# Patient Record
Sex: FEMALE | Race: WHITE | NOT HISPANIC OR LATINO | Employment: OTHER | URBAN - METROPOLITAN AREA
[De-identification: names, ages, dates, MRNs, and addresses within clinical notes are randomized per-mention and may not be internally consistent; named-entity substitution may affect disease eponyms.]

---

## 2021-01-01 ENCOUNTER — NURSING HOME VISIT (OUTPATIENT)
Dept: GERIATRICS | Facility: OTHER | Age: 86
End: 2021-01-01
Payer: COMMERCIAL

## 2021-01-01 ENCOUNTER — NURSING HOME VISIT (OUTPATIENT)
Dept: GERIATRICS | Facility: CLINIC | Age: 86
End: 2021-01-01
Payer: COMMERCIAL

## 2021-01-01 VITALS
TEMPERATURE: 98.2 F | DIASTOLIC BLOOD PRESSURE: 65 MMHG | SYSTOLIC BLOOD PRESSURE: 150 MMHG | HEART RATE: 52 BPM | WEIGHT: 165.1 LBS

## 2021-01-01 VITALS
DIASTOLIC BLOOD PRESSURE: 74 MMHG | SYSTOLIC BLOOD PRESSURE: 177 MMHG | WEIGHT: 163 LBS | HEART RATE: 72 BPM | TEMPERATURE: 98.3 F

## 2021-01-01 DIAGNOSIS — R41.89 COGNITIVE IMPAIRMENT: Primary | ICD-10-CM

## 2021-01-01 DIAGNOSIS — Z66 DNR (DO NOT RESUSCITATE): ICD-10-CM

## 2021-01-01 DIAGNOSIS — K21.9 GASTROESOPHAGEAL REFLUX DISEASE, UNSPECIFIED WHETHER ESOPHAGITIS PRESENT: ICD-10-CM

## 2021-01-01 DIAGNOSIS — E11.22 TYPE 2 DIABETES MELLITUS WITH STAGE 3 CHRONIC KIDNEY DISEASE, WITHOUT LONG-TERM CURRENT USE OF INSULIN, UNSPECIFIED WHETHER STAGE 3A OR 3B CKD (HCC): Primary | ICD-10-CM

## 2021-01-01 DIAGNOSIS — R26.2 AMBULATORY DYSFUNCTION: ICD-10-CM

## 2021-01-01 DIAGNOSIS — E87.6 HYPOKALEMIA: ICD-10-CM

## 2021-01-01 DIAGNOSIS — R41.89 COGNITIVE IMPAIRMENT: ICD-10-CM

## 2021-01-01 DIAGNOSIS — N18.30 TYPE 2 DIABETES MELLITUS WITH STAGE 3 CHRONIC KIDNEY DISEASE, WITHOUT LONG-TERM CURRENT USE OF INSULIN, UNSPECIFIED WHETHER STAGE 3A OR 3B CKD (HCC): Primary | ICD-10-CM

## 2021-01-01 DIAGNOSIS — E78.5 HYPERLIPIDEMIA, UNSPECIFIED HYPERLIPIDEMIA TYPE: ICD-10-CM

## 2021-01-01 DIAGNOSIS — N18.30 TYPE 2 DIABETES MELLITUS WITH STAGE 3 CHRONIC KIDNEY DISEASE, WITHOUT LONG-TERM CURRENT USE OF INSULIN, UNSPECIFIED WHETHER STAGE 3A OR 3B CKD (HCC): ICD-10-CM

## 2021-01-01 DIAGNOSIS — D50.9 IRON DEFICIENCY ANEMIA, UNSPECIFIED IRON DEFICIENCY ANEMIA TYPE: ICD-10-CM

## 2021-01-01 DIAGNOSIS — E11.22 TYPE 2 DIABETES MELLITUS WITH STAGE 3 CHRONIC KIDNEY DISEASE, WITHOUT LONG-TERM CURRENT USE OF INSULIN, UNSPECIFIED WHETHER STAGE 3A OR 3B CKD (HCC): ICD-10-CM

## 2021-01-01 DIAGNOSIS — I10 BENIGN ESSENTIAL HYPERTENSION: ICD-10-CM

## 2021-01-01 DIAGNOSIS — E87.6 HYPOKALEMIA: Primary | ICD-10-CM

## 2021-01-01 DIAGNOSIS — I69.30 CHRONIC CEREBROVASCULAR ACCIDENT (CVA): ICD-10-CM

## 2021-01-01 DIAGNOSIS — I10 PRIMARY HYPERTENSION: ICD-10-CM

## 2021-01-01 DIAGNOSIS — Z86.73 HISTORY OF STROKE: ICD-10-CM

## 2021-01-01 DIAGNOSIS — R54 FRAILTY SYNDROME IN GERIATRIC PATIENT: Primary | ICD-10-CM

## 2021-01-01 DIAGNOSIS — F03.90 DEMENTIA WITHOUT BEHAVIORAL DISTURBANCE, UNSPECIFIED DEMENTIA TYPE (HCC): ICD-10-CM

## 2021-01-01 DIAGNOSIS — R53.81 DEBILITY: ICD-10-CM

## 2021-01-01 DIAGNOSIS — N30.00 ACUTE CYSTITIS WITHOUT HEMATURIA: ICD-10-CM

## 2021-01-01 DIAGNOSIS — R35.0 URINARY FREQUENCY: ICD-10-CM

## 2021-01-01 DIAGNOSIS — N18.30 STAGE 3 CHRONIC KIDNEY DISEASE, UNSPECIFIED WHETHER STAGE 3A OR 3B CKD (HCC): ICD-10-CM

## 2021-01-01 DIAGNOSIS — I10 HYPERTENSION, UNSPECIFIED TYPE: ICD-10-CM

## 2021-01-01 DIAGNOSIS — K21.9 GASTROESOPHAGEAL REFLUX DISEASE, UNSPECIFIED WHETHER ESOPHAGITIS PRESENT: Primary | ICD-10-CM

## 2021-01-01 DIAGNOSIS — I10 BENIGN ESSENTIAL HYPERTENSION: Primary | ICD-10-CM

## 2021-01-01 PROCEDURE — 99309 SBSQ NF CARE MODERATE MDM 30: CPT | Performed by: PHYSICIAN ASSISTANT

## 2021-01-01 PROCEDURE — 99309 SBSQ NF CARE MODERATE MDM 30: CPT | Performed by: FAMILY MEDICINE

## 2021-01-01 PROCEDURE — 99316 NF DSCHRG MGMT 30 MIN+: CPT | Performed by: NURSE PRACTITIONER

## 2021-01-01 PROCEDURE — 99309 SBSQ NF CARE MODERATE MDM 30: CPT | Performed by: INTERNAL MEDICINE

## 2021-01-01 PROCEDURE — 99306 1ST NF CARE HIGH MDM 50: CPT | Performed by: FAMILY MEDICINE

## 2021-01-01 PROCEDURE — 99309 SBSQ NF CARE MODERATE MDM 30: CPT | Performed by: NURSE PRACTITIONER

## 2021-01-01 RX ORDER — PANTOPRAZOLE SODIUM 40 MG/1
40 TABLET, DELAYED RELEASE ORAL DAILY
COMMUNITY

## 2021-01-01 RX ORDER — POLYETHYLENE GLYCOL 3350 17 G/17G
17 POWDER, FOR SOLUTION ORAL DAILY PRN
COMMUNITY
End: 2021-01-01 | Stop reason: ALTCHOICE

## 2021-01-01 RX ORDER — ALBUTEROL SULFATE 2.5 MG/3ML
2.5 SOLUTION RESPIRATORY (INHALATION) EVERY 4 HOURS PRN
COMMUNITY
End: 2021-01-01 | Stop reason: ALTCHOICE

## 2021-01-01 RX ORDER — ATORVASTATIN CALCIUM 40 MG/1
40 TABLET, FILM COATED ORAL DAILY
COMMUNITY

## 2021-01-01 RX ORDER — AMLODIPINE BESYLATE 5 MG/1
5 TABLET ORAL 2 TIMES DAILY
COMMUNITY
End: 2022-01-01 | Stop reason: ALTCHOICE

## 2021-01-01 RX ORDER — LOSARTAN POTASSIUM 100 MG/1
100 TABLET ORAL DAILY
COMMUNITY
End: 2022-01-01 | Stop reason: ALTCHOICE

## 2021-01-01 RX ORDER — POTASSIUM CHLORIDE 20 MEQ/1
40 TABLET, EXTENDED RELEASE ORAL DAILY
COMMUNITY

## 2021-01-01 RX ORDER — AMOXICILLIN 250 MG
1 CAPSULE ORAL
COMMUNITY
Start: 2022-01-01

## 2021-01-01 RX ORDER — BISACODYL 10 MG
10 SUPPOSITORY, RECTAL RECTAL DAILY PRN
COMMUNITY

## 2021-01-01 RX ORDER — ACETAMINOPHEN 650 MG/1
650 SUPPOSITORY RECTAL EVERY 4 HOURS PRN
COMMUNITY
Start: 2022-01-01

## 2021-01-01 RX ORDER — ACETAMINOPHEN 325 MG/1
650 TABLET ORAL EVERY 4 HOURS PRN
COMMUNITY
Start: 2022-01-01

## 2021-01-01 RX ORDER — FERROUS SULFATE 325(65) MG
325 TABLET ORAL
COMMUNITY
Start: 2022-01-01 | End: 2022-01-01 | Stop reason: ALTCHOICE

## 2021-01-01 RX ORDER — B-COMPLEX WITH VITAMIN C
1 TABLET ORAL 2 TIMES DAILY
COMMUNITY

## 2021-01-01 RX ORDER — ACETAMINOPHEN 325 MG/1
650 TABLET ORAL EVERY 6 HOURS PRN
COMMUNITY
End: 2021-01-01 | Stop reason: DRUGHIGH

## 2021-01-01 RX ORDER — LANOLIN ALCOHOL/MO/W.PET/CERES
3 CREAM (GRAM) TOPICAL
COMMUNITY
End: 2021-01-01 | Stop reason: ALTCHOICE

## 2021-01-01 RX ORDER — SODIUM PHOSPHATE,MONO-DIBASIC 19G-7G/118
1 ENEMA (ML) RECTAL DAILY PRN
COMMUNITY

## 2021-07-22 ENCOUNTER — NURSING HOME VISIT (OUTPATIENT)
Dept: GERIATRICS | Facility: OTHER | Age: 86
End: 2021-07-22
Payer: COMMERCIAL

## 2021-07-22 DIAGNOSIS — E87.6 HYPOKALEMIA: ICD-10-CM

## 2021-07-22 DIAGNOSIS — D50.9 IRON DEFICIENCY ANEMIA, UNSPECIFIED IRON DEFICIENCY ANEMIA TYPE: ICD-10-CM

## 2021-07-22 DIAGNOSIS — N18.30 TYPE 2 DIABETES MELLITUS WITH STAGE 3 CHRONIC KIDNEY DISEASE, WITHOUT LONG-TERM CURRENT USE OF INSULIN, UNSPECIFIED WHETHER STAGE 3A OR 3B CKD (HCC): Primary | ICD-10-CM

## 2021-07-22 DIAGNOSIS — R53.81 DEBILITY: ICD-10-CM

## 2021-07-22 DIAGNOSIS — N18.30 STAGE 3 CHRONIC KIDNEY DISEASE, UNSPECIFIED WHETHER STAGE 3A OR 3B CKD (HCC): ICD-10-CM

## 2021-07-22 DIAGNOSIS — I69.30 CHRONIC CEREBROVASCULAR ACCIDENT (CVA): ICD-10-CM

## 2021-07-22 DIAGNOSIS — E11.22 TYPE 2 DIABETES MELLITUS WITH STAGE 3 CHRONIC KIDNEY DISEASE, WITHOUT LONG-TERM CURRENT USE OF INSULIN, UNSPECIFIED WHETHER STAGE 3A OR 3B CKD (HCC): Primary | ICD-10-CM

## 2021-07-22 DIAGNOSIS — I10 BENIGN ESSENTIAL HYPERTENSION: ICD-10-CM

## 2021-07-22 PROCEDURE — 99310 SBSQ NF CARE HIGH MDM 45: CPT | Performed by: FAMILY MEDICINE

## 2021-07-22 NOTE — PROGRESS NOTES
[facility]   [address] [place] [address], [place], [address]  Progress Note  POS: Nursing Facility/LTC-32    Unable to obtain from patient due to: Additional history obtained speaking with nursing/ nursing note review  Chief Complaint/Reason for visit: LTC follow up; transition of physician service  History of Present Illness: 80year old female evaluated for routine follow up of chronic medical conditions along with transition of physician service  I personally spoke with Dr Karli Alcantar via telephone regarding patient history and handoff  Medical records from [facility] obtained through [facility] and reviewed in detail  Active medical issues include hypokalemia, for which patient is on daily potassium supplementation; most recent K of 4 5 in May  She is also on amlodipine and losartan for hypertension, SBP trending 130s-140s  Continues on very low dose metformin for DM2; glucose generally trending 90s-100s on accuchecks; most recent A1c <6  Additionally continues on ferrous sulfate for anemia  Most recent labs from May reviewed; next due approx Sept 2021  Past Medical History: reviewed and updated  Family History: reviewed and updated  Social History: reviewed and updated  Resident Since: 9/24/19  Review of systems: Review of Systems   Constitutional: Negative for fever and unexpected weight change  Respiratory: Negative for cough and shortness of breath  Endocrine:        Negative for hypo- or hyper- glycemia   Musculoskeletal: Negative for arthralgias  All other systems reviewed and are negative  Medications: No changes made   Medication list reviewed and updated in Epic to reflect most current SNF orders  Allergies: NKDA  Labs/Diagnostics (reviewed by this provider): Copy in Chart  HbA1c:(5/3/21)- 5 8  CBC: (5/3/21)  CMP: (5/3/21) K:4 5    Physical Exam    Weight: 161 lb Temp:98 5F BP:143/59  Pulse:70 Resp:18 O2 Sat:91%RA  Constitutional: Well-nourished and Normocephalic    Physical Exam  Vitals and nursing note reviewed  Constitutional:       General: She is sleeping  She is not in acute distress  Appearance: She is well-developed and well-groomed  She is not ill-appearing, toxic-appearing or diaphoretic  HENT:      Head: Normocephalic and atraumatic  Right Ear: External ear normal       Left Ear: External ear normal       Nose: No rhinorrhea  Mouth/Throat:      Mouth: Mucous membranes are moist    Eyes:      General:         Right eye: No discharge  Left eye: No discharge  Pulmonary:      Effort: Pulmonary effort is normal  No respiratory distress  Abdominal:      General: There is no distension  Musculoskeletal:         General: No deformity  Cervical back: No rigidity  Right lower leg: No edema  Left lower leg: No edema  Skin:     Coloration: Skin is not jaundiced or pale  Neurological:      Mental Status: She is easily aroused  Cranial Nerves: No facial asymmetry         Assessment/Plan:  80year old female with:    DM [facility]diabetes mellitus), [facility] 2 ([facility] )    Lab Results   Component Value Date    HGBA1C 5 8 (H) 05/03/2021   Continue metformin 250mg daily  D/c accuchecks, have been consistently trending 90s-100s  Goal A1c <8 5 given age and comorbidities; on very low dose of metformin- will plan to discontinue if A1c remains <8 on repeat labs ordered for Sept 2021    Benign essential hypertension  Goal <140/90 in setting of DM2; SBP generally trending 130s-140s  Continue losartan 100mg daily; amlodipine 5mg BID; monitor BMP closely- at risk for hyperkalemia in setting of CKD3 and on chronic potassium supplementation with history of hypokalemia  Repeat BMP ordered for Sept 2021    CKD (chronic kidney disease) stage 3, GFR 30-59 ml/min ([facility])  Baseline creatinine 0 8-0 9  BMP May 2021 reviewed and stable; see above  Recheck BMP Sept 2021    Hypokalemia  See above; monitor renal function and potassium level closely  Continues on supplementation 40meq daily    Chronic cerebrovascular accident (CVA)  Continue atorvastatin  Risks vs benefits of initiating low dose aspirin- patient not taking currently     Iron deficiency anemia  CBC from May 2021 reviewed and stable/improved  Continue ferrous sulfate daily  Recheck CBC Sept 2021    Debility  Multifactorial  Continue LTC for 24/7 and ADL care  Supportive care, nutritional support  Management of chronic medical conditions as outlined  Fall precautions     Erna Munguia DO  7/22/21

## 2021-08-03 PROBLEM — D50.9 IRON DEFICIENCY ANEMIA: Status: ACTIVE | Noted: 2021-01-01

## 2021-08-03 PROBLEM — S06.5XAA SDH (SUBDURAL HEMATOMA): Status: ACTIVE | Noted: 2017-08-25

## 2021-08-03 PROBLEM — I69.30 CHRONIC CEREBROVASCULAR ACCIDENT (CVA): Status: ACTIVE | Noted: 2021-01-01

## 2021-08-03 PROBLEM — R53.81 DEBILITY: Status: ACTIVE | Noted: 2021-01-01

## 2021-08-03 PROBLEM — G89.29 CHRONIC BACK PAIN: Status: ACTIVE | Noted: 2020-01-14

## 2021-08-03 PROBLEM — N18.30 CKD (CHRONIC KIDNEY DISEASE) STAGE 3, GFR 30-59 ML/MIN (HCC): Status: ACTIVE | Noted: 2021-01-01

## 2021-08-03 PROBLEM — E87.6 HYPOKALEMIA: Status: ACTIVE | Noted: 2019-09-20

## 2021-08-03 PROBLEM — M54.9 CHRONIC BACK PAIN: Status: ACTIVE | Noted: 2020-01-14

## 2021-08-03 PROBLEM — I63.81 MULTIPLE LACUNAR INFARCTS (HCC): Status: ACTIVE | Noted: 2017-10-10

## 2021-08-03 PROBLEM — R41.89 COGNITIVE IMPAIRMENT: Status: ACTIVE | Noted: 2019-09-20

## 2021-08-03 PROBLEM — Z86.73 HISTORY OF TIA (TRANSIENT ISCHEMIC ATTACK): Status: ACTIVE | Noted: 2020-01-14

## 2021-08-03 PROBLEM — S06.5X9A SDH (SUBDURAL HEMATOMA) (HCC): Status: ACTIVE | Noted: 2017-08-25

## 2021-08-03 PROBLEM — K21.9 GERD (GASTROESOPHAGEAL REFLUX DISEASE): Status: ACTIVE | Noted: 2020-01-14

## 2021-08-03 NOTE — ASSESSMENT & PLAN NOTE
Goal <140/90 in setting of DM2; SBP generally trending 130s-140s  Continue losartan 100mg daily; amlodipine 5mg BID; monitor BMP closely- at risk for hyperkalemia in setting of CKD3 and on chronic potassium supplementation with history of hypokalemia  Repeat BMP ordered for Sept 2021

## 2021-08-03 NOTE — ASSESSMENT & PLAN NOTE
Lab Results   Component Value Date    HGBA1C 5 8 (H) 05/03/2021   Continue metformin 250mg daily  D/c accuchecks, have been consistently trending 90s-100s  Goal A1c <8 5 given age and comorbidities; on very low dose of metformin- will plan to discontinue if A1c remains <8 on repeat labs ordered for Sept 2021

## 2021-08-03 NOTE — ASSESSMENT & PLAN NOTE
Baseline creatinine 0 8-0 9  BMP May 2021 reviewed and stable; see above  Recheck [facility] Sept 2021

## 2021-08-03 NOTE — ASSESSMENT & PLAN NOTE
Continue atorvastatin  Risks vs benefits of initiating low dose aspirin- patient not taking currently

## 2021-08-03 NOTE — ASSESSMENT & PLAN NOTE
See above; monitor renal function and potassium level closely  Continues on supplementation 40meq daily

## 2021-08-03 NOTE — ASSESSMENT & PLAN NOTE
Multifactorial  Continue LTC for 24/7 and ADL care  Supportive care, nutritional support  Management of chronic medical conditions as outlined  Fall precautions

## 2021-10-05 PROBLEM — K59.00 CONSTIPATION: Status: ACTIVE | Noted: 2021-01-01

## 2021-11-08 PROBLEM — R26.2 AMBULATORY DYSFUNCTION: Status: ACTIVE | Noted: 2021-01-01

## 2021-11-08 PROBLEM — R35.0 URINARY FREQUENCY: Status: ACTIVE | Noted: 2021-01-01

## 2021-11-08 PROBLEM — N30.00 ACUTE CYSTITIS WITHOUT HEMATURIA: Status: ACTIVE | Noted: 2021-01-01

## 2021-12-12 PROBLEM — Z66 DNR (DO NOT RESUSCITATE): Status: ACTIVE | Noted: 2021-01-01

## 2021-12-12 PROBLEM — F03.90 DEMENTIA (HCC): Status: ACTIVE | Noted: 2019-09-20

## 2021-12-12 PROBLEM — Z86.73 HISTORY OF STROKE: Status: ACTIVE | Noted: 2021-01-01

## 2021-12-12 PROBLEM — R54 FRAILTY SYNDROME IN GERIATRIC PATIENT: Status: ACTIVE | Noted: 2021-01-01

## 2022-01-01 ENCOUNTER — HOSPICE ADMISSION (OUTPATIENT)
Dept: HOME HOSPICE | Facility: HOSPICE | Age: 87
End: 2022-01-01
Payer: MEDICARE

## 2022-01-01 ENCOUNTER — HOME CARE VISIT (OUTPATIENT)
Dept: HOME HOSPICE | Facility: HOSPICE | Age: 87
End: 2022-01-01
Payer: MEDICARE

## 2022-01-01 ENCOUNTER — HOME CARE VISIT (OUTPATIENT)
Dept: HOME HEALTH SERVICES | Facility: HOME HEALTHCARE | Age: 87
End: 2022-01-01
Payer: MEDICARE

## 2022-01-01 ENCOUNTER — NURSING HOME VISIT (OUTPATIENT)
Dept: GERIATRICS | Facility: OTHER | Age: 87
End: 2022-01-01
Payer: COMMERCIAL

## 2022-01-01 ENCOUNTER — APPOINTMENT (EMERGENCY)
Dept: RADIOLOGY | Facility: HOSPITAL | Age: 87
End: 2022-01-01
Payer: COMMERCIAL

## 2022-01-01 ENCOUNTER — HOSPITAL ENCOUNTER (EMERGENCY)
Facility: HOSPITAL | Age: 87
Discharge: HOSPICE | End: 2022-07-15
Attending: EMERGENCY MEDICINE | Admitting: EMERGENCY MEDICINE
Payer: COMMERCIAL

## 2022-01-01 ENCOUNTER — TELEPHONE (OUTPATIENT)
Dept: OTHER | Facility: OTHER | Age: 87
End: 2022-01-01

## 2022-01-01 VITALS
HEART RATE: 80 BPM | SYSTOLIC BLOOD PRESSURE: 118 MMHG | DIASTOLIC BLOOD PRESSURE: 68 MMHG | TEMPERATURE: 97.1 F | RESPIRATION RATE: 28 BRPM

## 2022-01-01 VITALS
SYSTOLIC BLOOD PRESSURE: 100 MMHG | WEIGHT: 165 LBS | RESPIRATION RATE: 20 BRPM | DIASTOLIC BLOOD PRESSURE: 58 MMHG | HEART RATE: 80 BPM

## 2022-01-01 VITALS
SYSTOLIC BLOOD PRESSURE: 85 MMHG | HEART RATE: 73 BPM | TEMPERATURE: 97.9 F | OXYGEN SATURATION: 100 % | DIASTOLIC BLOOD PRESSURE: 49 MMHG | WEIGHT: 165.2 LBS | RESPIRATION RATE: 29 BRPM

## 2022-01-01 VITALS
WEIGHT: 165.2 LBS | HEART RATE: 155 BPM | RESPIRATION RATE: 36 BRPM | SYSTOLIC BLOOD PRESSURE: 96 MMHG | DIASTOLIC BLOOD PRESSURE: 55 MMHG | TEMPERATURE: 97.8 F

## 2022-01-01 VITALS
OXYGEN SATURATION: 94 % | SYSTOLIC BLOOD PRESSURE: 134 MMHG | TEMPERATURE: 97.5 F | WEIGHT: 166.5 LBS | DIASTOLIC BLOOD PRESSURE: 63 MMHG

## 2022-01-01 VITALS
DIASTOLIC BLOOD PRESSURE: 78 MMHG | WEIGHT: 165.2 LBS | HEART RATE: 90 BPM | TEMPERATURE: 98.6 F | SYSTOLIC BLOOD PRESSURE: 180 MMHG

## 2022-01-01 VITALS
HEART RATE: 77 BPM | TEMPERATURE: 97.5 F | SYSTOLIC BLOOD PRESSURE: 144 MMHG | WEIGHT: 163.3 LBS | DIASTOLIC BLOOD PRESSURE: 67 MMHG

## 2022-01-01 DIAGNOSIS — R77.8 ELEVATED TROPONIN: ICD-10-CM

## 2022-01-01 DIAGNOSIS — E11.9 TYPE 2 DIABETES MELLITUS WITHOUT COMPLICATION, WITHOUT LONG-TERM CURRENT USE OF INSULIN (HCC): ICD-10-CM

## 2022-01-01 DIAGNOSIS — I63.81 MULTIPLE LACUNAR INFARCTS (HCC): ICD-10-CM

## 2022-01-01 DIAGNOSIS — U07.1 COVID: Primary | ICD-10-CM

## 2022-01-01 DIAGNOSIS — N18.30 STAGE 3 CHRONIC KIDNEY DISEASE, UNSPECIFIED WHETHER STAGE 3A OR 3B CKD (HCC): ICD-10-CM

## 2022-01-01 DIAGNOSIS — Z86.73 HISTORY OF STROKE: ICD-10-CM

## 2022-01-01 DIAGNOSIS — I10 HYPERTENSION, UNSPECIFIED TYPE: Primary | ICD-10-CM

## 2022-01-01 DIAGNOSIS — F03.90 DEMENTIA WITHOUT BEHAVIORAL DISTURBANCE, UNSPECIFIED DEMENTIA TYPE (HCC): ICD-10-CM

## 2022-01-01 DIAGNOSIS — D50.9 IRON DEFICIENCY ANEMIA, UNSPECIFIED IRON DEFICIENCY ANEMIA TYPE: ICD-10-CM

## 2022-01-01 DIAGNOSIS — K59.00 CONSTIPATION, UNSPECIFIED CONSTIPATION TYPE: ICD-10-CM

## 2022-01-01 DIAGNOSIS — I25.10 CORONARY ARTERY DISEASE INVOLVING NATIVE CORONARY ARTERY OF NATIVE HEART WITHOUT ANGINA PECTORIS: ICD-10-CM

## 2022-01-01 DIAGNOSIS — I10 HYPERTENSION, UNSPECIFIED TYPE: ICD-10-CM

## 2022-01-01 DIAGNOSIS — R53.81 DEBILITY: ICD-10-CM

## 2022-01-01 DIAGNOSIS — R54 FRAILTY SYNDROME IN GERIATRIC PATIENT: ICD-10-CM

## 2022-01-01 DIAGNOSIS — K21.9 GASTROESOPHAGEAL REFLUX DISEASE, UNSPECIFIED WHETHER ESOPHAGITIS PRESENT: Primary | ICD-10-CM

## 2022-01-01 DIAGNOSIS — R54 FRAILTY SYNDROME IN GERIATRIC PATIENT: Primary | ICD-10-CM

## 2022-01-01 DIAGNOSIS — Z66 DNR (DO NOT RESUSCITATE): ICD-10-CM

## 2022-01-01 DIAGNOSIS — I21.9 MI (MYOCARDIAL INFARCTION) (HCC): ICD-10-CM

## 2022-01-01 DIAGNOSIS — I47.20 VENTRICULAR TACHYCARDIA: Primary | ICD-10-CM

## 2022-01-01 DIAGNOSIS — E78.5 HYPERLIPIDEMIA, UNSPECIFIED HYPERLIPIDEMIA TYPE: ICD-10-CM

## 2022-01-01 DIAGNOSIS — E87.6 HYPOKALEMIA: ICD-10-CM

## 2022-01-01 DIAGNOSIS — R07.9 CHEST PAIN, UNSPECIFIED TYPE: Primary | ICD-10-CM

## 2022-01-01 LAB
ANION GAP SERPL CALCULATED.3IONS-SCNC: 5 MMOL/L (ref 4–13)
ATRIAL RATE: 102 BPM
ATRIAL RATE: 136 BPM
ATRIAL RATE: 141 BPM
ATRIAL RATE: 148 BPM
ATRIAL RATE: 150 BPM
ATRIAL RATE: 153 BPM
ATRIAL RATE: 157 BPM
ATRIAL RATE: 75 BPM
BASOPHILS # BLD AUTO: 0.05 THOUSANDS/ΜL (ref 0–0.1)
BASOPHILS NFR BLD AUTO: 0 % (ref 0–1)
BUN SERPL-MCNC: 44 MG/DL (ref 5–25)
CALCIUM SERPL-MCNC: 9.4 MG/DL (ref 8.3–10.1)
CARDIAC TROPONIN I PNL SERPL HS: ABNORMAL NG/L
CHLORIDE SERPL-SCNC: 108 MMOL/L (ref 100–108)
CO2 SERPL-SCNC: 24 MMOL/L (ref 21–32)
CREAT SERPL-MCNC: 1.3 MG/DL (ref 0.6–1.3)
EOSINOPHIL # BLD AUTO: 0.25 THOUSAND/ΜL (ref 0–0.61)
EOSINOPHIL NFR BLD AUTO: 2 % (ref 0–6)
ERYTHROCYTE [DISTWIDTH] IN BLOOD BY AUTOMATED COUNT: 15.2 % (ref 11.6–15.1)
GFR SERPL CREATININE-BSD FRML MDRD: 35 ML/MIN/1.73SQ M
GLUCOSE SERPL-MCNC: 148 MG/DL (ref 65–140)
HCT VFR BLD AUTO: 40.1 % (ref 34.8–46.1)
HGB BLD-MCNC: 13 G/DL (ref 11.5–15.4)
IMM GRANULOCYTES # BLD AUTO: 0.15 THOUSAND/UL (ref 0–0.2)
IMM GRANULOCYTES NFR BLD AUTO: 1 % (ref 0–2)
LYMPHOCYTES # BLD AUTO: 1.09 THOUSANDS/ΜL (ref 0.6–4.47)
LYMPHOCYTES NFR BLD AUTO: 8 % (ref 14–44)
MCH RBC QN AUTO: 31.2 PG (ref 26.8–34.3)
MCHC RBC AUTO-ENTMCNC: 32.4 G/DL (ref 31.4–37.4)
MCV RBC AUTO: 96 FL (ref 82–98)
MONOCYTES # BLD AUTO: 1.05 THOUSAND/ΜL (ref 0.17–1.22)
MONOCYTES NFR BLD AUTO: 8 % (ref 4–12)
NEUTROPHILS # BLD AUTO: 11.04 THOUSANDS/ΜL (ref 1.85–7.62)
NEUTS SEG NFR BLD AUTO: 81 % (ref 43–75)
NRBC BLD AUTO-RTO: 0 /100 WBCS
P AXIS: 100 DEGREES
P AXIS: 73 DEGREES
P AXIS: 75 DEGREES
P AXIS: 75 DEGREES
P AXIS: 79 DEGREES
P AXIS: 96 DEGREES
PLATELET # BLD AUTO: 261 THOUSANDS/UL (ref 149–390)
PMV BLD AUTO: 10.9 FL (ref 8.9–12.7)
POTASSIUM SERPL-SCNC: 5.3 MMOL/L (ref 3.5–5.3)
PR INTERVAL: 112 MS
PR INTERVAL: 210 MS
PR INTERVAL: 224 MS
PR INTERVAL: 224 MS
PR INTERVAL: 232 MS
QRS AXIS: -73 DEGREES
QRS AXIS: -73 DEGREES
QRS AXIS: -74 DEGREES
QRS AXIS: -75 DEGREES
QRS AXIS: -75 DEGREES
QRS AXIS: -77 DEGREES
QRS AXIS: -78 DEGREES
QRS AXIS: -81 DEGREES
QRSD INTERVAL: 118 MS
QRSD INTERVAL: 120 MS
QRSD INTERVAL: 142 MS
QRSD INTERVAL: 146 MS
QRSD INTERVAL: 150 MS
QRSD INTERVAL: 150 MS
QRSD INTERVAL: 156 MS
QRSD INTERVAL: 162 MS
QT INTERVAL: 320 MS
QT INTERVAL: 322 MS
QT INTERVAL: 328 MS
QT INTERVAL: 334 MS
QT INTERVAL: 338 MS
QT INTERVAL: 346 MS
QT INTERVAL: 354 MS
QT INTERVAL: 364 MS
QTC INTERVAL: 406 MS
QTC INTERVAL: 435 MS
QTC INTERVAL: 510 MS
QTC INTERVAL: 518 MS
QTC INTERVAL: 520 MS
QTC INTERVAL: 520 MS
QTC INTERVAL: 522 MS
QTC INTERVAL: 530 MS
RBC # BLD AUTO: 4.17 MILLION/UL (ref 3.81–5.12)
SODIUM SERPL-SCNC: 137 MMOL/L (ref 136–145)
T WAVE AXIS: 87 DEGREES
T WAVE AXIS: 94 DEGREES
T WAVE AXIS: 95 DEGREES
T WAVE AXIS: 96 DEGREES
T WAVE AXIS: 96 DEGREES
T WAVE AXIS: 97 DEGREES
T WAVE AXIS: 98 DEGREES
T WAVE AXIS: 99 DEGREES
VENTRICULAR RATE: 102 BPM
VENTRICULAR RATE: 130 BPM
VENTRICULAR RATE: 137 BPM
VENTRICULAR RATE: 148 BPM
VENTRICULAR RATE: 150 BPM
VENTRICULAR RATE: 153 BPM
VENTRICULAR RATE: 157 BPM
VENTRICULAR RATE: 75 BPM
WBC # BLD AUTO: 13.63 THOUSAND/UL (ref 4.31–10.16)

## 2022-01-01 PROCEDURE — G0155 HHCP-SVS OF CSW,EA 15 MIN: HCPCS

## 2022-01-01 PROCEDURE — T2042 HOSPICE ROUTINE HOME CARE: HCPCS

## 2022-01-01 PROCEDURE — 99291 CRITICAL CARE FIRST HOUR: CPT | Performed by: EMERGENCY MEDICINE

## 2022-01-01 PROCEDURE — 99152 MOD SED SAME PHYS/QHP 5/>YRS: CPT | Performed by: EMERGENCY MEDICINE

## 2022-01-01 PROCEDURE — 84484 ASSAY OF TROPONIN QUANT: CPT

## 2022-01-01 PROCEDURE — 36415 COLL VENOUS BLD VENIPUNCTURE: CPT

## 2022-01-01 PROCEDURE — G0156 HHCP-SVS OF AIDE,EA 15 MIN: HCPCS

## 2022-01-01 PROCEDURE — 99308 SBSQ NF CARE LOW MDM 20: CPT | Performed by: PHYSICIAN ASSISTANT

## 2022-01-01 PROCEDURE — 99309 SBSQ NF CARE MODERATE MDM 30: CPT | Performed by: INTERNAL MEDICINE

## 2022-01-01 PROCEDURE — 99285 EMERGENCY DEPT VISIT HI MDM: CPT

## 2022-01-01 PROCEDURE — 92960 CARDIOVERSION ELECTRIC EXT: CPT | Performed by: EMERGENCY MEDICINE

## 2022-01-01 PROCEDURE — G0299 HHS/HOSPICE OF RN EA 15 MIN: HCPCS

## 2022-01-01 PROCEDURE — 85025 COMPLETE CBC W/AUTO DIFF WBC: CPT

## 2022-01-01 PROCEDURE — 93010 ELECTROCARDIOGRAM REPORT: CPT | Performed by: INTERNAL MEDICINE

## 2022-01-01 PROCEDURE — 93308 TTE F-UP OR LMTD: CPT | Performed by: EMERGENCY MEDICINE

## 2022-01-01 PROCEDURE — 99309 SBSQ NF CARE MODERATE MDM 30: CPT | Performed by: NURSE PRACTITIONER

## 2022-01-01 PROCEDURE — 96365 THER/PROPH/DIAG IV INF INIT: CPT

## 2022-01-01 PROCEDURE — 99309 SBSQ NF CARE MODERATE MDM 30: CPT | Performed by: PHYSICIAN ASSISTANT

## 2022-01-01 PROCEDURE — 96360 HYDRATION IV INFUSION INIT: CPT

## 2022-01-01 PROCEDURE — 80048 BASIC METABOLIC PNL TOTAL CA: CPT

## 2022-01-01 PROCEDURE — 99285 EMERGENCY DEPT VISIT HI MDM: CPT | Performed by: INTERNAL MEDICINE

## 2022-01-01 PROCEDURE — 99307 SBSQ NF CARE SF MDM 10: CPT | Performed by: PHYSICIAN ASSISTANT

## 2022-01-01 PROCEDURE — 92960 CARDIOVERSION ELECTRIC EXT: CPT

## 2022-01-01 RX ORDER — ETOMIDATE 2 MG/ML
20 INJECTION INTRAVENOUS ONCE
Status: COMPLETED | OUTPATIENT
Start: 2022-01-01 | End: 2022-01-01

## 2022-01-01 RX ORDER — AMLODIPINE BESYLATE AND BENAZEPRIL HYDROCHLORIDE 5; 20 MG/1; MG/1
1 CAPSULE ORAL DAILY
COMMUNITY
Start: 2022-01-01 | End: 2022-01-01

## 2022-01-01 RX ORDER — DOXYCYCLINE HYCLATE 50 MG/1
324 CAPSULE, GELATIN COATED ORAL EVERY OTHER DAY
COMMUNITY
Start: 2022-01-01

## 2022-01-01 RX ORDER — SODIUM CHLORIDE 9 MG/ML
3 INJECTION INTRAVENOUS
Status: DISCONTINUED | OUTPATIENT
Start: 2022-01-01 | End: 2022-01-01 | Stop reason: HOSPADM

## 2022-01-01 RX ADMIN — AMIODARONE HYDROCHLORIDE 150 MG: 50 INJECTION, SOLUTION INTRAVENOUS at 11:34

## 2022-01-01 RX ADMIN — SODIUM CHLORIDE 1000 ML: 0.9 INJECTION, SOLUTION INTRAVENOUS at 11:30

## 2022-01-01 RX ADMIN — ETOMIDATE 20 MG: 2 INJECTION, SOLUTION INTRAVENOUS at 11:04

## 2022-01-03 NOTE — PROGRESS NOTES
[address]  [address]  Facility: [facility]  32  Follow-up visit    NAME: Maria Isabel Teresa  AGE: 80 y o  SEX: female    DATE OF ENCOUNTER: 1/3/2022    Code status:  DNR    Assessment and Plan     1  Frailty syndrome in geriatric patient  Assessment & Plan:  · Secondary to her chronic medical conditions   · She continues to require 24/7 care/support of her ADLs  · She is level 6 on the clinical frailty scale consistent with being moderately frail   · Will continue with care/support of her ADLs as a long-term resident at the nursing facility      2  Dementia without behavioral disturbance, unspecified dementia type [facility])  Assessment & Plan:  · She is doing well with care/support of her ADLs as a long-term resident at the nursing facility   · Will continue to provide a safe, secure, structured, and supportive environment at the nursing facility   · Will continue to monitor for change in her condition      3  Multiple lacunar infarcts [facility])  Assessment & Plan:  · She continues on atorvastatin for secondary prevention   · Per discussion with her daughter, she is not on aspirin secondary to prior history of GI bleeding  · Will continue with clinical and periodic laboratory monitoring for change in her condition      4  Hypertension, unspecified type  Assessment & Plan:  · She continues on amlodipine and losartan   · As discussed with nursing, will continue with monitoring of her BP and AP on a routine basis   · Will continue with clinical and periodic laboratory monitoring for change in her condition      5  Hyperlipidemia, unspecified hyperlipidemia type  Assessment & Plan:  · She continues on atorvastatin  · Will continue with clinical and periodic laboratory monitoring for change in her condition      6   Type 2 diabetes mellitus without complication, without long-term current use of insulin [facility])  Assessment & Plan:  · October 6, 2021: Hemoglobin A1c:  6 1%,    · She is doing well without medication  · Will continue with clinical and periodic laboratory monitoring for change in her condition      7  Constipation, unspecified constipation type  Assessment & Plan:  · Nursing staff endorses that she is doing well on routine Senokot S   · Will continue with this medication   · Will continue with monitoring for change in her condition      8  DNR (do not resuscitate)    See my note of December 10, 2021 for further assessment and plan  All medications and routine orders were reviewed and updated as needed  Plan discussed with:  Nursing staff  Chief Complaint     She is seen for a follow-up visit to update the care and treatment of her type 2 diabetes mellitus, frailty, dementia without behavior disturbance, and hypertension  History of Present Illness     She is a 80-year-old woman who is seen with female nursing staff for a follow-up visit to update the care and treatment of her type 2 diabetes mellitus-she is doing well without medication, frailty-she continues to require 24/7 care/support of her ADLs, dementia without behavior disturbance-doing well with care/support as a long-term resident of the nursing facility, and hypertension-doing well with amlodipine and losartan  Nursing staff endorses that her appetite is good, that she sleeps well, and that she has no difficulty with constipation  She requires assistance of 1 for transfers by report  Nursing staff endorses that she does not exhibit any distressing her disturbing behaviors  Review of her electronic record shows that she had a stay at a short-term rehabilitation facility prior to being admitted to the nursing facility      The following portions of the patient's history were reviewed and updated as appropriate: current medications, past family history, past medical history, past social history, past surgical history and problem list     Allergies:  No Known Allergies    Review of Systems     Review of Systems   Unable to perform ROS: Dementia       Medications and orders     All medications reviewed and updated in Nursing Home EMR  Objective     Vitals:  Monthly vitals:  Weight 163 3 lb, fasting blood sugar 101  Physical Exam  Vitals reviewed  Exam conducted with a chaperone present  Constitutional:       General: She is awake  She is not in acute distress  Appearance: She is well-developed  She is not ill-appearing, toxic-appearing or diaphoretic  Comments: She appears comfortable, her stated age, and frail  Neurological:      Mental Status: She is alert  Psychiatric:         Behavior: Behavior is cooperative  Pertinent Laboratory/Diagnostic Studies: The following labs were reviewed please see chart or hospital paperwork for details  October 6, 2021: Hemoglobin A1c:  6 1%,  (from [facility])    - Her monthly order summary was reviewed and signed      NORMA Laurent   1/9/2022 5:38 PM

## 2022-01-09 PROBLEM — N30.00 ACUTE CYSTITIS WITHOUT HEMATURIA: Status: RESOLVED | Noted: 2021-01-01 | Resolved: 2022-01-01

## 2022-01-09 NOTE — ASSESSMENT & PLAN NOTE
· She is doing well with care/support of her ADLs as a long-term resident at the nursing facility   · Will continue to provide a safe, secure, structured, and supportive environment at the nursing facility   · Will continue to monitor for change in her condition

## 2022-01-09 NOTE — ASSESSMENT & PLAN NOTE
· Secondary to her chronic medical conditions   · She continues to require 24/7 care/support of her ADLs  · She is level 6 on the clinical frailty scale consistent with being moderately frail   · Will continue with care/support of her ADLs as a long-term resident at the nursing facility

## 2022-01-09 NOTE — ASSESSMENT & PLAN NOTE
· She continues on atorvastatin for secondary prevention   · Per discussion with her daughter, she is not on aspirin secondary to prior history of GI bleeding  · Will continue with clinical and periodic laboratory monitoring for change in her condition

## 2022-01-09 NOTE — ASSESSMENT & PLAN NOTE
· Nursing staff endorses that she is doing well on routine Senokot S   · Will continue with this medication   · Will continue with monitoring for change in her condition

## 2022-01-09 NOTE — ASSESSMENT & PLAN NOTE
· She continues on atorvastatin  · Will continue with clinical and periodic laboratory monitoring for change in her condition

## 2022-01-09 NOTE — ASSESSMENT & PLAN NOTE
· October 6, 2021: Hemoglobin A1c:  6 1%,    · She is doing well without medication  · Will continue with clinical and periodic laboratory monitoring for change in her condition

## 2022-01-09 NOTE — ASSESSMENT & PLAN NOTE
· She continues on amlodipine and losartan   · As discussed with nursing, will continue with monitoring of her BP and AP on a routine basis   · Will continue with clinical and periodic laboratory monitoring for change in her condition

## 2022-02-02 NOTE — PROGRESS NOTES
[address]  [phone]) [address]  Code  32      NAME: Gwendolyn Waddell  AGE: 80 y o  SEX: female [id number]    DATE OF ENCOUNTER: 2/2/2022    CODE STATUS: DNR    Assessment and Plan     Problem List Items Addressed This Visit        Digestive    GERD (gastroesophageal reflux disease) - Primary     Continue PPI            Cardiovascular and Mediastinum    Hypertension     BP stable  Continue amlodipine and losartan             Nervous and Auditory    Dementia (HCC)     Doing well in long term care facility   Continue supportive care            Genitourinary    CKD (chronic kidney disease) stage 3, GFR 30-59 ml/min ([facility])       11/2/21  Creat 0 85  Avoid nephrotoxins               No orders of the defined types were placed in this encounter  Chief Complaint     Chief Complaint   Patient presents with    Geriatric Evaluation     follow up       History of Present Illness   80year old female resident of [address] [place] [address] being seen today in collaboration with nursing for follow up for chronic conditions  Patient feels well and offers no complaints  Nursing has no concerns  The following portions of the patient's history were reviewed and updated as appropriate: allergies, current medications, past family history, past medical history, past social history, past surgical history and problem list     Review of Systems   Review of Systems   Constitutional: Negative  HENT: Negative  Eyes: Negative  Respiratory: Negative  Cardiovascular: Negative  Gastrointestinal: Negative  Endocrine: Negative  Genitourinary: Negative  Musculoskeletal: Positive for back pain  Skin: Negative  Allergic/Immunologic: Negative  Neurological: Negative  Hematological: Negative  Psychiatric/Behavioral: Negative             Active Problem List     Patient Active Problem List   Diagnosis    Hypertension    Coronary artery disease involving native coronary artery of native heart without angina pectoris    Chronic back pain    Dementia (HCC)    Type 2 diabetes mellitus without complication, without long-term current use of insulin (HCC)    GERD (gastroesophageal reflux disease)    History of TIA (transient ischemic attack)    Hyperlipidemia    Peripheral vascular disease, unspecified ([facility] )    Hypokalemia    Multiple lacunar infarcts (HCC)    SDH (subdural hematoma) [facility]    Spinal stenosis of lumbar region    CKD (chronic kidney disease) stage 3, GFR 30-59 ml/min ([facility])    History of stroke    Iron deficiency anemia    Debility    Constipation    Urinary frequency    Ambulatory dysfunction    Frailty syndrome in geriatric patient    DNR (do not resuscitate)         Objective     /67   Pulse 77   Temp 97 5 °F (36 4 °C)   Wt 74 1 kg (163 lb 4 8 oz)     Physical Exam  Vitals reviewed  Constitutional:       General: She is not in acute distress  Appearance: She is not ill-appearing or diaphoretic  HENT:      Head: Normocephalic and atraumatic  Nose: Nose normal  No congestion  Mouth/Throat:      Mouth: Mucous membranes are moist       Pharynx: Oropharynx is clear  Eyes:      Pupils: Pupils are equal, round, and reactive to light  Cardiovascular:      Rate and Rhythm: Normal rate and regular rhythm  Pulses: Normal pulses  Pulmonary:      Effort: Pulmonary effort is normal  No respiratory distress  Breath sounds: Normal breath sounds  No wheezing  Abdominal:      General: Abdomen is flat  Bowel sounds are normal  There is no distension  Palpations: Abdomen is soft  Tenderness: There is no abdominal tenderness  Musculoskeletal:         General: No swelling, tenderness, deformity or signs of injury  Cervical back: Neck supple  Lymphadenopathy:      Cervical: No cervical adenopathy  Skin:     General: Skin is warm and dry  Findings: No bruising or erythema  Neurological:      General: No focal deficit present  Mental Status: She is alert  Mental status is at baseline  Psychiatric:         Mood and Affect: Mood normal          Behavior: Behavior normal          Pertinent Laboratory/Diagnostic Studies:    11/2/21  hgb 11 3  Creat 0 85  K 4 3    Current Medications   Medications reviewed and updated in facility chart

## 2022-04-06 NOTE — PROGRESS NOTES
[address]  [address] [place] [address]  Facility: [facility]  32  FOLLOW-UP VISIT    NAME: Adriana Paz  AGE: 80 y o  SEX: female    DATE OF ENCOUNTER: 4/6/2022    Code status:  DNR    Assessment and Plan     1  Hypertension, unspecified type  Assessment & Plan:  · She is doing well with amlodipine and losartan   · Will switch to Lotrel 5/20 1 capsule daily to help decrease her pill burden  · Will continue with clinical and periodic laboratory monitoring for change in her condition      2  Frailty syndrome in geriatric patient  Assessment & Plan:  · Secondary to her chronic medical conditions   · She continues to require care/support of her ADLs   · She is level 6 on the clinical frailty scale consistent with being moderately frail   · I recommend continued care/support of her ADLs as a long-term resident at the nursing facility   · Will continue to monitor for change in her condition      3  Stage 3 chronic kidney disease, unspecified whether stage 3a or 3b CKD ([facility] )  Assessment & Plan:  · Clinically she is doing well   · Will have laboratory testing performed to update her renal values  · Will continue with monitoring for change in her condition      4  Type 2 diabetes mellitus without complication, without long-term current use of insulin ([facility])  Assessment & Plan:  · Review of her fingerstick blood sugar log looks well without medication  · Will continue with clinical and periodic laboratory monitoring for change in her condition      5  Iron deficiency anemia, unspecified iron deficiency anemia type  Assessment & Plan:  · She continues on oral iron replacement therapy   · Will have laboratory testing performed to update her laboratory values  · Will continue with monitoring for change in her condition      6   Constipation, unspecified constipation type  Assessment & Plan:  · Nursing staff endorses that she is doing well with routine tay  · Will trial a gradual dose reduction from 2 tablets daily to 1 tablet daily  · Will continue with monitoring for change in her condition      7  Coronary artery disease involving native coronary artery of native heart without angina pectoris  Assessment & Plan:  · She is doing well with atorvastatin for secondary prevention   · She is not on aspirin therapy as previously care planned  · Will continue with monitoring for change in her condition      8  History of stroke  Assessment & Plan:  · She is doing well with secondary prevention of atorvastatin  · She is not on antiplatelet therapy (aspirin), as previously care planned  · Will continue with monitoring for change in her condition      9  DNR (do not resuscitate)  Can   See my note of January 3, 2022 for further assessment and plan  All medications and routine orders were reviewed and updated as needed  Plan discussed with:  Nursing staff  Chief Complaint     She is seen for a follow-up visit to update the care and treatment of her hypertension, history of multiple lacunar CVAs, stage 3 chronic kidney disease, and frailty secondary to her chronic medical conditions  History of Present Illness     She is a 51-year-old woman who is seen with female nursing staff for a follow-up visit to update the care and treatment of her hypertension-doing well with amlodipine and losartan, history of multiple lacunar CVAs-doing well with atorvastatin, stage 3 chronic kidney disease-asymptomatic, and frailty secondary to her chronic medical conditions-she continues to require 24/7 care/support of her ADLs  Nursing staff endorses that she is eating well, that she is sleeping well, and that she is having no difficulty with constipation  She requires minimal assistance with her ADLs  Nursing reports that she is not exhibiting any behaviors that are disruptive to the nursing unit are distressing to her  She endorses that her appetite is good    She denies chest pain and shortness of breath  The following portions of the patient's history were reviewed and updated as appropriate: current medications, past family history, past medical history, past social history, past surgical history and problem list     Allergies:  No Known Allergies    Review of Systems     Review of Systems   Constitutional: Negative for appetite change  Respiratory: Negative for shortness of breath  Cardiovascular: Negative for chest pain  Medications and orders     All medications reviewed and updated in Nursing Home EMR  Objective     Vitals:  Monthly vitals:  Weight 163 7 lb, blood pressure 116/59, fasting fingerstick blood sugar 120  Physical Exam  Vitals reviewed  Exam conducted with a chaperone present  Constitutional:       General: She is awake  She is not in acute distress  Appearance: She is well-developed  She is not ill-appearing, toxic-appearing or diaphoretic  Comments: Appears comfortable, stated age, and frail  Cardiovascular:      Rate and Rhythm: Normal rate and regular rhythm  Heart sounds: Murmur heard  No friction rub  No gallop  Comments: There is no pretibial edema, bilaterally  Pulmonary:      Effort: Pulmonary effort is normal  No respiratory distress  Breath sounds: Normal breath sounds  No stridor  No wheezing, rhonchi or rales  Neurological:      Mental Status: She is alert  Psychiatric:         Behavior: Behavior is cooperative  Pertinent Laboratory/Diagnostic Studies: The following labs were reviewed please see chart or hospital paperwork for details  October 6, 2021:     Hemoglobin A1c:  6 1%,      Vitamin B12 level: 263    - Her order summary was reviewed and signed  Portions of the record may have been created with voice recognition software  Occasional wrong word or "sound a like" substitutions may have occurred due to the inherent limitations of voice recognition software  Read the chart carefully and recognize, using context, where substitutions have occurred      NORMA Almaraz   4/24/2022 1:01 PM

## 2022-04-24 NOTE — ASSESSMENT & PLAN NOTE
· Review of her fingerstick blood sugar log looks well without medication  · Will continue with clinical and periodic laboratory monitoring for change in her condition

## 2022-04-24 NOTE — ASSESSMENT & PLAN NOTE
· Secondary to her chronic medical conditions   · She continues to require care/support of her ADLs   · She is level 6 on the clinical frailty scale consistent with being moderately frail   · I recommend continued care/support of her ADLs as a long-term resident at the nursing facility   · Will continue to monitor for change in her condition

## 2022-04-24 NOTE — ASSESSMENT & PLAN NOTE
· She is doing well with atorvastatin for secondary prevention   · She is not on aspirin therapy as previously care planned  · Will continue with monitoring for change in her condition

## 2022-04-24 NOTE — ASSESSMENT & PLAN NOTE
· She continues on oral iron replacement therapy   · Will have laboratory testing performed to update her laboratory values  · Will continue with monitoring for change in her condition

## 2022-04-24 NOTE — ASSESSMENT & PLAN NOTE
· She is doing well with secondary prevention of atorvastatin  · She is not on antiplatelet therapy (aspirin), as previously care planned  · Will continue with monitoring for change in her condition

## 2022-04-24 NOTE — ASSESSMENT & PLAN NOTE
· Nursing staff endorses that she is doing well with routine senna  · Will trial a gradual dose reduction from 2 tablets daily to 1 tablet daily  · Will continue with monitoring for change in her condition

## 2022-04-24 NOTE — ASSESSMENT & PLAN NOTE
· She is doing well with amlodipine and losartan   · Will switch to Lotrel 5/20 1 capsule daily to help decrease her pill burden  · Will continue with clinical and periodic laboratory monitoring for change in her condition

## 2022-04-24 NOTE — ASSESSMENT & PLAN NOTE
· Clinically she is doing well   · Will have laboratory testing performed to update her renal values  · Will continue with monitoring for change in her condition

## 2022-06-07 NOTE — ASSESSMENT & PLAN NOTE
SBPs trending 120s to 150s in May  Continue Lotrel 5mg/20mg daily  Most recent CMP stable  Continue to monitor and adjust medication as needed

## 2022-06-07 NOTE — ASSESSMENT & PLAN NOTE
Most recent potassium level 4 3   Continue potassium supplement 40 mEq daily  Continue to monitor routinely

## 2022-06-07 NOTE — ASSESSMENT & PLAN NOTE
Lab Results   Component Value Date    HGBA1C 6 1 (H) 10/06/2021   Most recent hemoglobin A1c 6 3 on 04/14/2022  Continue to check FBS monthly  Currently not on any antidiabetic medications

## 2022-06-07 NOTE — ASSESSMENT & PLAN NOTE
Baseline creatinine approximately 0 89  Most recent creatinine 0 96/GFR 56  Continue to monitor renal status routinely  Avoid hypotension  Avoid nephrotoxic medications such as NSAIDs  Renal dose medications  Ensure adequate hydration

## 2022-06-07 NOTE — PROGRESS NOTES
Facility:  [facility]  POS: 28 (LTC)  Progress Note    Chief Complaint/Reason for visit: LTC follow-up  Code status:  DNR/DNI  History obtained from patient, nursing staff, and EMR  History of Present Illness:  59-year-old female seen and examined for LT follow up of chronic medical conditions  Medical history significant for hypertension, stage 3 chronic kidney disease, type 2 diabetes mellitus without long-term current use of insulin, coronary artery disease, history of stroke, iron deficiency anemia, constipation, and debility  At time of examination, patient is resting in bed watching TV, and appeared in no distress  Denies having pain or discomfort  Denies shortness of breath, chest pain, headache, dizziness, abdominal pain, nausea, vomiting, diarrhea, or constipation  No acute issues reported by nursing  Vital signs and weights have been stable  Past Medical History: unchanged from history and physical  Past Medical History:   Diagnosis Date    Coronary artery disease     Diabetes mellitus ([facility] )     Stroke ([facility] )     Uterine cancer ([facility] )      Family History: unchanged from history and physical  Social History: unchanged from history and physical  Resident Since:  10/28/2021  Review of systems: Review of Systems   Constitutional: Negative for appetite change, chills and diaphoresis  HENT: Negative for congestion  Eyes: Positive for visual disturbance  Respiratory: Negative for cough and shortness of breath  Cardiovascular: Negative for chest pain  Gastrointestinal: Negative for abdominal pain, constipation, diarrhea, nausea and vomiting  Endocrine: Negative  Genitourinary: Negative for difficulty urinating  Musculoskeletal: Positive for gait problem  Neurological: Negative for dizziness, syncope, speech difficulty, light-headedness, numbness and headaches  Psychiatric/Behavioral: Negative for behavioral problems and dysphoric mood   The patient is not nervous/anxious  All other systems reviewed and are negative  Medications: All medication and routine orders were reviewed and updated  Allergies: NKDA  Consults reviewed: Other  Labs/Diagnostics (reviewed by this provider): Copy in Chart    Imaging Reviewed:  None today    Physical Exam    Weight:  166 5 lb           Temp:  98 5        BP: 132/61  Pulse:  70 Resp: 16      O2 Sat:  96% on room air  Constitutional: Well-nourished and Normocephalic  Orientation:Person     Physical Exam  Vitals and nursing note reviewed  Constitutional:       General: She is not in acute distress  Appearance: She is not toxic-appearing or diaphoretic  Comments: Elderly female who appears in no distress  HENT:      Head: Normocephalic  Nose: No congestion or rhinorrhea  Mouth/Throat:      Mouth: Mucous membranes are moist       Pharynx: No oropharyngeal exudate  Eyes:      General: No scleral icterus  Right eye: No discharge  Left eye: No discharge  Extraocular Movements: Extraocular movements intact  Conjunctiva/sclera: Conjunctivae normal       Pupils: Pupils are equal, round, and reactive to light  Comments: Wears glasses  Cardiovascular:      Rate and Rhythm: Normal rate and regular rhythm  Pulses: Normal pulses  Pulmonary:      Effort: Pulmonary effort is normal  No respiratory distress  Breath sounds: Normal breath sounds  No wheezing, rhonchi or rales  Abdominal:      General: Bowel sounds are normal  There is no distension  Palpations: Abdomen is soft  Tenderness: There is no abdominal tenderness  There is no guarding  Musculoskeletal:      Cervical back: Neck supple  No rigidity  Right lower leg: No edema  Left lower leg: No edema  Comments: Moves all 4 extremities  Lymphadenopathy:      Cervical: No cervical adenopathy  Skin:     General: Skin is warm and dry        Capillary Refill: Capillary refill takes less than 2 seconds  Neurological:      General: No focal deficit present  Mental Status: She is alert  Mental status is at baseline  Comments: With memory and cognitive impairment  Alert and oriented to self  Pleasant and cooperative with exam   Patient recalled her birthday accurately  Speech clear and appropriate  Psychiatric:         Mood and Affect: Mood normal          Behavior: Behavior normal          Thought Content:  Thought content normal        Assessment/Plan:  63-year-old female with:    Hypertension  SBPs trending 120s to 150s in May  Continue Lotrel 5mg/20mg daily  Most recent CMP stable  Continue to monitor and adjust medication as needed    CKD (chronic kidney disease) stage 3, GFR 30-59 ml/min ([facility])  Baseline creatinine approximately 0 89  Most recent creatinine 0 96/GFR 56  Continue to monitor renal status routinely  Avoid hypotension  Avoid nephrotoxic medications such as NSAIDs  Renal dose medications  Ensure adequate hydration    Type 2 diabetes mellitus without complication, without long-term current use of insulin ([facility])    Lab Results   Component Value Date    HGBA1C 6 1 (H) 10/06/2021   Most recent hemoglobin A1c 6 3 on 04/14/2022  Continue to check FBS monthly  Currently not on any antidiabetic medications    Hypokalemia  Most recent potassium level 4 3   Continue potassium supplement 40 mEq daily  Continue to monitor routinely    Iron deficiency anemia  CBC stable with hemoglobin 12 0 2/35 6  Patient is on ferrous gluconate every 48 hours  Continue senna to prevent constipation    History of stroke  Stable  Continue atorvastatin    Dementia ([facility] )  Continue 24/7 care and support at long-term care facility for ADLs; IADLs  No behavior issues reported  Continue supportive care, reorientation, and redirection as needed  Continue delirium precautions  Monitor sleep/wake cycle  Encourage good sleep hygiene  Avoid deliriogenic medications  Monitor for pain and ensure that pain is adequately controlled  Monitor bowel and bladder status to ensure that patient is not constipated or returning urine which could cause delirium    Debility  Multifactorial  Continue supportive care  PT/OT/ST as needed  Continue fall precautions  Ensure adequate hydration and nutrition  Management of acute and chronic medical conditions as outlined    Constipation  Controlled with senna    This note was completed in part utilizing m-modal fluency direct voice recognition software  Grammatical errors, random word insertion, spelling mistakes, and incomplete sentences may be an occasional consequence of the system secondary to software limitations, ambient noise and hardware issues  At the time of dictation, efforts were made to edit, clarify and/or correct errors  Please read the chart carefully and recognize, using context, where substitutions have occurred  If you have any questions or concerns about the context, text or information contained within the body of this dictation, please contact myself, the provider, for further clarification      [address], [address] Dereck Rutherford  5/5/55596:53 PM

## 2022-06-07 NOTE — ASSESSMENT & PLAN NOTE
CBC stable with hemoglobin 12 0 2/35 6  Patient is on ferrous gluconate every 48 hours  Continue senna to prevent constipation

## 2022-06-07 NOTE — ASSESSMENT & PLAN NOTE
Multifactorial  Continue supportive care  PT/OT/ST as needed  Continue fall precautions  Ensure adequate hydration and nutrition  Management of acute and chronic medical conditions as outlined

## 2022-06-14 PROBLEM — U07.1 COVID: Status: ACTIVE | Noted: 2022-01-01

## 2022-06-14 NOTE — PROGRESS NOTES
[id number] [facility]  [phone]  [address] [facility]  Code  32      NAME: Irasema Carmen  AGE: 80 y o  SEX: female [id number]    DATE OF ENCOUNTER: 6/13/22    CODE STATUS: DNR    Assessment and Plan     Problem List Items Addressed This Visit        Other    COVID - Primary     Diagnosed today  Known exposure  Complains of cough, no fever  Maintaining sat on room air  Eating well    Will start paxlovid on dosing guideline recommendations  Maintain sats above 92%  Monitor I&O closely                 No orders of the defined types were placed in this encounter  Chief Complaint     Chief Complaint   Patient presents with    Geriatric Evaluation     covid       History of Present Illness   80year old female resident of [address] [place] [address] being seen today after being diagnosed with covid  She complains of a cough but no fever  She is maintaining sats  She is eating well  The following portions of the patient's history were reviewed and updated as appropriate: allergies, current medications, past family history, past medical history, past social history, past surgical history and problem list     Review of Systems   Review of Systems   Constitutional: Negative  Respiratory: Positive for cough  Negative for shortness of breath  Cardiovascular: Negative  Neurological: Negative             Active Problem List     Patient Active Problem List   Diagnosis    Hypertension    Coronary artery disease involving native coronary artery of native heart without angina pectoris    Chronic back pain    Dementia ([facility] )    Type 2 diabetes mellitus without complication, without long-term current use of insulin ([facility])    GERD (gastroesophageal reflux disease)    History of TIA (transient ischemic attack)    Hyperlipidemia    Peripheral vascular disease, unspecified [facility] )    Hypokalemia    Multiple lacunar infarcts ([facility] )    SDH [facility]subdural hematoma) ([facility])    Spinal stenosis of lumbar region    CKD (chronic kidney disease) stage 3, GFR 30-59 ml/min ([facility])    History of stroke    Iron deficiency anemia    Debility    Constipation    Urinary frequency    Ambulatory dysfunction    Frailty syndrome in geriatric patient    DNR (do not resuscitate)    COVID         Objective     /63   Temp 97 5 °F (36 4 °C)   Wt 75 5 kg (166 lb 8 oz)   SpO2 94%     Physical Exam  Vitals reviewed  Constitutional:       General: She is not in acute distress  Appearance: She is not ill-appearing or diaphoretic  HENT:      Head: Normocephalic and atraumatic  Pulmonary:      Effort: Pulmonary effort is normal  No respiratory distress  Skin:     General: Skin is warm and dry  Neurological:      Mental Status: She is alert  Mental status is at baseline  Pertinent Laboratory/Diagnostic Studies:    none    Current Medications   Medications reviewed and updated in facility chart

## 2022-06-14 NOTE — ASSESSMENT & PLAN NOTE
Diagnosed today  Known exposure  Complains of cough, no fever  Maintaining sat on room air   Eating well    Will start paxlovid on dosing guideline recommendations  Maintain sats above 92%  Monitor I&O closely

## 2022-07-12 NOTE — PROGRESS NOTES
[address] [facility]  [phone]  [address]  Code 32      NAME: Aparna Woody  AGE: 80 y o  SEX: female [id number]    DATE OF ENCOUNTER: 7/12/2022    CODE STATUS: DNR    Assessment and Plan     Problem List Items Addressed This Visit        Digestive    GERD (gastroesophageal reflux disease) - Primary     Known hx of GERD  On PPI  Started complaining of epigastric pain last night which has not subsided  Does not radiate  Denies burping or gas  Does not appear to be in any discomfort, she cannot be more specific about her complaints  Ordered TUMS, states she did not have relief  Will order blood work tomorrow including lipase              Cardiovascular and Mediastinum    Hypertension     BP's have been running high, currently 180/78  Unclear if her epigastric pain may be chest pain as she cannot be very specific about her complaints and she did not have relief with TUMS  Did try 1 nitro as well, had appropriate drop in BP down to 153/88 but no change in her pain    In regards to her BP that has been running on the higher side for some time, will increase her lotrel dose from 5/20 to 10/20  Will continue close monitoring of VS                   No orders of the defined types were placed in this encounter  Chief Complaint     Chief Complaint   Patient presents with    Geriatric Evaluation     Epigastric pain       History of Present Illness   80year old female resident of [address] [place] [address] being seen today in collaboration with nursing for epigastric pain since last night  No radiation of the pain  She has had decreased appetite today  No fever          The following portions of the patient's history were reviewed and updated as appropriate: allergies, current medications, past family history, past medical history, past social history, past surgical history and problem list     Review of Systems   Review of Systems Constitutional: Positive for appetite change  Respiratory: Negative  Cardiovascular: Negative  Gastrointestinal:        Epigastric pain          Active Problem List     Patient Active Problem List   Diagnosis    Hypertension    Coronary artery disease involving native coronary artery of native heart without angina pectoris    Chronic back pain    Dementia (H[facility])    Type 2 diabetes mellitus without complication, without long-term current use of insulin ([facility])    GERD (gastroesophageal reflux disease)    History of TIA (transient ischemic attack)    Hyperlipidemia    Peripheral vascular disease, unspecified (HCC)    Hypokalemia    Multiple lacunar infarcts (HCC)    [facility]) [facility]    Spinal stenosis of lumbar region    CKD (chronic kidney disease) stage 3, GFR 30-59 ml/min ([facility])    History of stroke    Iron deficiency anemia    Debility    Constipation    Urinary frequency    Ambulatory dysfunction    Frailty syndrome in geriatric patient    DNR (do not resuscitate)    COVID         Objective     BP (!) 180/78   Pulse 90   Temp 98 6 °F (37 °C)   Wt 74 9 kg (165 lb 3 2 oz)     Physical Exam  Vitals reviewed  Constitutional:       General: She is not in acute distress  Appearance: She is not ill-appearing or diaphoretic  Cardiovascular:      Rate and Rhythm: Normal rate  Pulmonary:      Effort: Pulmonary effort is normal  No respiratory distress  Abdominal:      General: There is no distension  Palpations: Abdomen is soft  Tenderness: There is no abdominal tenderness  Skin:     General: Skin is warm and dry  Findings: No bruising or erythema  Neurological:      Mental Status: She is alert  Mental status is at baseline  Pertinent Laboratory/Diagnostic Studies:    pending    Current Medications   Medications reviewed and updated in facility chart

## 2022-07-12 NOTE — ASSESSMENT & PLAN NOTE
BP's have been running high, currently 180/78  Unclear if her epigastric pain may be chest pain as she cannot be very specific about her complaints and she did not have relief with TUMS  Did try 1 nitro as well, had appropriate drop in BP down to 153/88 but no change in her pain    In regards to her BP that has been running on the higher side for some time, will increase her lotrel dose from 5/20 to 10/20  Will continue close monitoring of VS

## 2022-07-12 NOTE — ASSESSMENT & PLAN NOTE
Known hx of GERD  On PPI  Started complaining of epigastric pain last night which has not subsided  Does not radiate  Denies burping or gas  Does not appear to be in any discomfort, she cannot be more specific about her complaints  Ordered TUMS, states she did not have relief  Will order blood work tomorrow including lipase

## 2022-07-15 PROBLEM — R07.9 CHEST PAIN: Status: ACTIVE | Noted: 2022-01-01

## 2022-07-15 NOTE — ED ATTENDING ATTESTATION
7/15/2022  IKeyur DO, saw and evaluated the patient  I have discussed the patient with the resident/non-physician practitioner and agree with the resident's/non-physician practitioner's findings, Plan of Care, and MDM as documented in the resident's/non-physician practitioner's note, except where noted  All available labs and Radiology studies were reviewed  I was present for key portions of any procedure(s) performed by the resident/non-physician practitioner and I was immediately available to provide assistance  At this point I agree with the current assessment done in the Emergency Department  I have conducted an independent evaluation of this patient a history and physical is as follows:    Patient is a 41-year-old female with a history of previous MI, mild dementia, transferred from nursing facility  Patient says that 2 days ago she began having some mild chest pain, felt a bit weak and fatigued, yesterday felt a little better, then today had increasing chest pain  Some nausea but no vomiting  No dysuria hematuria  No fever or chills  Says she does not remember if anything made her pain worse or better  Patient's daughter accompanies her indicates the patient does have some mild poor short-term memory secondary to dementia and is at her baseline  Accompany records the patient had outpatient blood work drawn 2 days ago which was an unremarkable CMP, CBC, lipase  Patient was noted today to be tachycardic, EMS was called, they saw found that she was in a rapid wide complex tachycardia      General:  Patient is well-appearing  Head:  Atraumatic  Eyes:  Conjunctiva pink  ENT:  Mucous membranes are moist  Neck:  Supple  Cardiac:  S1-S2, without murmurs  Lungs:  Clear to auscultation bilaterally, slightly labored breathing  Abdomen:  Soft, nontender, normal bowel sounds, no CVA tenderness, no tympany, no rigidity, no guarding  Extremities:  Normal range of motion, no pedal edema or calf asymmetry, radial pulses are equal and symmetric bilaterally  Neurologic:  Awake, fluent speech, AAOx3,   Skin:  Pink warm and dry  Psychiatric:  Alert, pleasant, cooperative      ED Course     ECG interpreted me, rapid wide complex tachycardia, rate of 157  Discussed with daughter about goals of care, she had to get the patient was do not resuscitate, do not intubate, would want to be kept comfortable but would not want to have CPR or intubation, but would be okay with attempts at cardioversion tube change sinus rhythm  Patient initial blood pressure somewhat low, with her increased work of breathing and wide complex tachycardia, presumed it was most likely ventricular tachycardia and that she was relatively unstable  Decision was made to perform procedural sedation and cardioversion  Patient was sedated with 20 mg of etomidate, synchronized cardioverted at 200 joules biphasic without change  Cardioverted at 300 joules biphasic without change  Pad placement was changed from anterior posterior to anterior lateral, cardioverted with 360 joules without change  CBC, BMP resulted which were unremarkable    Patient's blood pressure became lower, given 150 mg amiodarone IV, started on amiodarone drip 1 mg a minute  Patient given lidocaine 150 mg IV    Cardiology consulted, patient seen by cardiology fellow and by attending Dr Samina Hobbs, recommended additional 150 mg of amiodarone IV and 150 mg lidocaine IV which was given  They performed a bedside transthoracic echocardiogram which reportedly showed significant wall motion abnormalities consistent with significant myocardial infarction  Patient did have brief return of sinus rhythm, ECG showed sinus rhythm, rate of 102, nonspecific interventricular conduction delay, ST segment elevation in V2 only      Patient then returned to ventricular tachycardia    Patient continued to be hypotensive, troponin resulted as being greater than 22,973    Further discussion held between Dr Elaine Ledesma and patient's daughter, given that patient still had refractory ventricular tachycardia, that in order to attempt cardioversion medical stabilization, would require aggressive care including vasopressors, admission to ICU, possible intubation  daughter indicated that patient would not want these aggressive measures and the decision was made to make the patient comfort measures only    On reassessment, patient eyes would open to voice, able to drink a little water, but not able hold of fluent conversation  Hospice evaluated patient, patient will be placed on home hospice at her nursing facility, patient is to be transported back there today  Daughter was quite comfortable with this plan  Critical Care Time  Procedures    45 minutes critical care time    Please see my separate procedure note for details

## 2022-07-15 NOTE — ED PROVIDER NOTES
History  Chief Complaint   Patient presents with    Chest Pain     Patient reports chest pain for 2 days, VTach this morning  77-year-old female past medical history mild dementia, hypertension, CAD (heart attack many years ago), GERD presents to ED complaining of 2 days of chest pain  Per patient's daughter at bedside, patient was complaining of epigastric abdominal pain approximately 2 days ago  She was seen by advanced practitioner at the nursing home and was treated with resolution of her chest pain  It was decided that her pain was due to her GERD  This morning pain recurred and now patient began complaining of left-sided chest pain  At her nursing facility she was noted to be tachycardic to the 140s to 160s and patient sent to the ED for evaluation  EN route to the ED, tele strip obtained by EMS shows V-tach to 140s 160s BPM  On arrival to the ED, patient appears ill but is A/O x3, conversant and able to express that she is having chest pain, denies any shortness of breath or abdominal pain  Patient's daughter arrives at bedside shortly after patient's arrival and expresses that patient is DNR/DNI however is amenable to procedural sedation and attempts at cardioversion  Prior to Admission Medications   Prescriptions Last Dose Informant Patient Reported?  Taking?   acetaminophen (TYLENOL) 325 mg tablet   Yes No   Sig: Take 650 mg by mouth every 4 (four) hours as needed   acetaminophen (TYLENOL) 650 mg suppository   Yes No   Sig: Insert 650 mg into the rectum every 4 (four) hours as needed   amLODIPine-benazepril (LOTREL 5-20) 5-20 MG per capsule   Yes No   Sig: Take 1 capsule by mouth daily   atorvastatin (LIPITOR) 40 mg tablet   Yes No   Sig: Take 40 mg by mouth daily   bisacodyl (DULCOLAX) 10 mg suppository   Yes No   Sig: Insert 10 mg into the rectum daily as needed    calcium carbonate-vitamin D (OSCAL-D) 500 mg-200 units per tablet   Yes No   Sig: Take 1 tablet by mouth 2 (two) times a day   ferrous gluconate (FERGON) 324 mg tablet   Yes No   Sig: Take 324 mg by mouth every other day   magnesium hydroxide (MILK OF MAGNESIA) 400 mg/5 mL oral suspension   Yes No   Sig: Take 30 mL by mouth daily as needed   pantoprazole (PROTONIX) 40 mg tablet   Yes No   Sig: Take 40 mg by mouth daily   potassium chloride (K-DUR,KLOR-CON) 20 mEq tablet   Yes No   Sig: Take 40 mEq by mouth daily   senna-docusate sodium (SENOKOT S) 8 6-50 mg per tablet   Yes No   Sig: Take 1 tablet by mouth in the morning    sodium phosphate-biphosphate (FLEET) 7-19 g 118 mL enema   Yes No   Sig: Insert 1 enema into the rectum daily as needed       Facility-Administered Medications: None       Past Medical History:   Diagnosis Date    Coronary artery disease     Diabetes mellitus ([facility] )     Stroke ([facility] )     Uterine cancer ([facility] )        Past Surgical History:   Procedure Laterality Date    BREAST SURGERY Right     Lumpectomy    CATARACT EXTRACTION, BILATERAL      HYSTERECTOMY      OOPHORECTOMY         Family History   Problem Relation Age of Onset    Heart attack Mother     Diabetes Father     Stroke Father     Dementia Brother      I have reviewed and agree with the history as documented  E-Cigarette/Vaping     E-Cigarette/Vaping Substances     Social History     Tobacco Use    Smoking status: Never Smoker    Smokeless tobacco: Never Used   Substance Use Topics    Alcohol use: Not Currently        Review of Systems   Constitutional: Negative for chills and fever  HENT: Negative for ear pain and sore throat  Eyes: Negative for pain and visual disturbance  Respiratory: Negative for cough and shortness of breath  Cardiovascular: Positive for chest pain  Negative for palpitations  Gastrointestinal: Negative for abdominal pain and vomiting  Genitourinary: Negative for dysuria and hematuria  Musculoskeletal: Negative for arthralgias and back pain  Skin: Negative for color change and rash     Neurological: Negative for seizures and syncope  All other systems reviewed and are negative  Physical Exam  ED Triage Vitals [07/15/22 1050]   Temperature Pulse Respirations Blood Pressure SpO2   97 9 °F (36 6 °C) (!) 157 20 99/60 95 %      Temp Source Heart Rate Source Patient Position - Orthostatic VS BP Location FiO2 (%)   Tympanic Monitor Lying Left arm --      Pain Score       7             Orthostatic Vital Signs  Vitals:    07/15/22 1147 07/15/22 1230 07/15/22 1245 07/15/22 1343   BP: (!) 85/49      Pulse: (!) 128 70 68 73   Patient Position - Orthostatic VS:           Physical Exam  Vitals and nursing note reviewed  Constitutional:       General: She is not in acute distress  Appearance: She is well-developed  She is ill-appearing and diaphoretic  She is not toxic-appearing  Comments: Patient appears pale, ill  HENT:      Head: Normocephalic and atraumatic  Eyes:      Conjunctiva/sclera: Conjunctivae normal    Cardiovascular:      Rate and Rhythm: Regular rhythm  Tachycardia present  Pulses:           Carotid pulses are 1+ on the right side and 1+ on the left side  Radial pulses are 1+ on the right side and 1+ on the left side  Heart sounds: Heart sounds are distant  No murmur heard  Pulmonary:      Effort: Tachypnea and accessory muscle usage present  No respiratory distress  Breath sounds: Normal breath sounds  Chest:      Chest wall: No mass, deformity, tenderness or crepitus  Abdominal:      Palpations: Abdomen is soft  Tenderness: There is no abdominal tenderness  Musculoskeletal:      Cervical back: Neck supple  Right lower leg: No edema  Left lower leg: No edema  Skin:     General: Skin is warm  Capillary Refill: Capillary refill takes 2 to 3 seconds  Coloration: Skin is pale  Neurological:      Mental Status: She is oriented to person, place, and time  She is lethargic  Cranial Nerves: No dysarthria or facial asymmetry           ED Medications  Medications   etomidate (AMIDATE) 2 mg/mL injection 20 mg (20 mg Intravenous Given 7/15/22 1104)   amiodarone 150 mg in dextrose 5 % 100 mL IV bolus (0 mg Intravenous Stopped 7/15/22 1210)   sodium chloride 0 9 % bolus 1,000 mL (0 mL Intravenous Stopped 7/15/22 1210)       Diagnostic Studies  Results Reviewed     Procedure Component Value Units Date/Time    HS Troponin 0hr (reflex protocol) [[id number]]  (Abnormal) Collected: 07/15/22 1103    Lab Status: Final result Specimen: Blood from Arm, Right Updated: 07/15/22 1155     hs TnI 0hr >22,973 ng/L     Basic metabolic panel [[id number]]  (Abnormal) Collected: 07/15/22 1103    Lab Status: Final result Specimen: Blood from Arm, Right Updated: 07/15/22 1127     Sodium 137 mmol/L      Potassium 5 3 mmol/L      Chloride 108 mmol/L      CO2 24 mmol/L      ANION GAP 5 mmol/L      BUN 44 mg/dL      Creatinine 1 30 mg/dL      Glucose 148 mg/dL      Calcium 9 4 mg/dL      eGFR 35 ml/min/1 73sq m     Narrative:      [facility] guidelines for Chronic Kidney Disease (CKD):     Stage 1 with normal or high GFR (GFR > 90 mL/min/1 73 square meters)    Stage 2 Mild CKD (GFR = 60-89 mL/min/1 73 square meters)    Stage 3A Moderate CKD (GFR = 45-59 mL/min/1 73 square meters)    Stage 3B Moderate CKD (GFR = 30-44 mL/min/1 73 square meters)    Stage 4 Severe CKD (GFR = 15-29 mL/min/1 73 square meters)    Stage 5 End Stage CKD (GFR <15 mL/min/1 73 square meters)  Note: GFR calculation is accurate only with a steady state creatinine    CBC and differential [[id number]]  (Abnormal) Collected: 07/15/22 1103    Lab Status: Final result Specimen: Blood from Arm, Right Updated: 07/15/22 1110     WBC 13 63 Thousand/uL      RBC 4 17 Million/uL      Hemoglobin 13 0 g/dL      Hematocrit 40 1 %      MCV 96 fL      MCH 31 2 pg      MCHC 32 4 g/dL      RDW 15 2 %      MPV 10 9 fL      Platelets 081 Thousands/uL      nRBC 0 /100 WBCs      Neutrophils Relative 81 % Immat GRANS % 1 %      Lymphocytes Relative 8 %      Monocytes Relative 8 %      Eosinophils Relative 2 %      Basophils Relative 0 %      Neutrophils Absolute 11 04 Thousands/µL      Immature Grans Absolute 0 15 Thousand/uL      Lymphocytes Absolute 1 09 Thousands/µL      Monocytes Absolute 1 05 Thousand/µL      Eosinophils Absolute 0 25 Thousand/µL      Basophils Absolute 0 05 Thousands/µL                  No orders to display         Procedures  Pre-Procedural Sedation  Performed by: Anatoly Sims MD  Authorized by: Anatoly Sims MD     Consent:     Consent obtained:  Emergent situation    Consent given by:  Patient    Risks discussed:  Dysrhythmia, inadequate sedation and respiratory compromise necessitating ventilatory assistance and intubation    Alternatives discussed:  Analgesia without sedation and anxiolysis  Universal protocol:     Patient identity confirmation method:  Arm band  Indications:     Sedation purpose:  Cardioversion    Procedure necessitating sedation performed by:  Physician performing sedation    Intended level of sedation:  Moderate (conscious sedation)  Pre-sedation assessment:     NPO status caution: unable to specify NPO status      ASA classification: class 5 - moribund patient, not expected to live without the procedure      Neck mobility: normal      Mouth opening:  3 or more finger widths    Thyromental distance:  3 finger widths    Mallampati score:  I - soft palate, uvula, fauces, pillars visible    Pre-sedation assessments completed and reviewed: airway patency, cardiovascular function, hydration status, mental status, nausea/vomiting, pain level, respiratory function and temperature      History of difficult intubation: no    Procedural Sedation    Date/Time: 7/16/2022 8:21 AM  Performed by: Anatoly Sims MD  Authorized by:  Anatoly Sims MD     Procedure details (see MAR for exact dosages):     Sedation start time:  7/15/2022 11:04 AM    Preoxygenation:  Nasal cannula    Sedation: Etomidate    Intra-procedure monitoring:  Blood pressure monitoring, cardiac monitor, continuous capnometry, continuous pulse oximetry, frequent LOC assessments and frequent vital sign checks    Intra-procedure events: hypotension and hypoxia      Intra-procedure management:  Airway repositioning    Sedation end time:  7/15/2022 12:10 PM    Total sedation time (minutes):  66  Post-procedure details:     Post-sedation assessment completed:  7/15/2022 12:11 AM    Attendance: Constant attendance by certified staff until patient recovered      Recovery: Patient returned to pre-procedure baseline      Post-sedation assessments completed and reviewed: airway patency, cardiovascular function, hydration status, mental status, nausea/vomiting, pain level, respiratory function and temperature      Patient is stable for discharge or admission: yes (to hospice)      Patient tolerance: Tolerated with difficulty  POC Cardiac US    Date/Time: 7/16/2022 8:24 AM  Performed by: Jax Rojas MD  Authorized by: Jax Rojas MD     Patient location:  ED  Other Assisting Provider: No    Procedure details:     Exam Type:  Diagnostic    Indications: hypotension, chest pain and dyspnea      Assessment / Evaluation for: cardiac function, pericardial effusion and right heart strain (suspected pulmonary embolism)      Exam Type: initial exam      Image quality: limited diagnostic      Image availability:  Images available in PACS  Patient Details:     Cardiac Rhythm:  Irregular    Mechanical ventilation: No    Cardiac findings:     Echo technique: limited 2D      Views obtained: parasternal long axis, parasternal short axis, subcostal and apical      Pericardial effusion: absent      Tamponade physiology: absent      Wall motion: hypodynamic      LV systolic function: depressed      RV dilation: none            ED Course  ED Course as of 07/16/22 0840   Fri Jul 15, 2022   1207 Patient made comfort measure, hospice contacted at this time  [address] coming to evaluate  1432 Patient will return to [facility]  with home hospice  Geovanna Gentile liaison to sign consents, and await transport back to home  5 Pt transport set for 6pm                 Identification of Seniors at [facility] Most Recent Value   (ISAR) Identification of Seniors at Risk    Before the illness or injury that brought you to the Emergency, did you need someone to help you on a regular basis? 1 Filed at: 07/15/2022 1054   In the last 24 hours, have you needed more help than usual? 1 Filed at: 07/15/2022 1054   Have you been hospitalized for one or more nights during the past 6 months? 1 Filed at: 07/15/2022 1054   In general, do you see well? 0 Filed at: 07/15/2022 1054   In general, do you have serious problems with your memory? 0 Filed at: 07/15/2022 1054   Do you take more than three different medications every day? 1 Filed at: 07/15/2022 1054   ISAR Score 4 Filed at: 07/15/2022 1054                              MDM  Number of Diagnoses or Management Options  Elevated troponin  MI (myocardial infarction) ([facility] )  Ventricular tachycardia ([facility] )  Diagnosis management comments: 80-year-old female past medical history mild dementia, hypertension, CAD (heart attack many years ago), GERD presents to ED complaining of 2 days of chest pain  DDx:  Missed MI, ACS, cardiogenic shock, unstable vtach  Patient presented to the ED with hypotension, chest pain and in a wide-complex monomorphic rhythm concerning for unstable vtach  Patient's daughter arrived and confrimed DNR/DNI status however decision was made to attempt cardioversion with sedation  Patient sedated with etomidate and synchronized cardioversion was attempted with 200J without success  One more attempt with 300J, followed by repositioning of paced pads from AP to anterior-lateral position with third unsuccessful attempt at 360J  Cardiology and EP called to bedside for consultation  Bedside ECHO preformed showing diffuse hypokinesia of the heart, suggesting massive missed MI leading to deterioration into vtach  In consultation Cardiology and patient's family goals of care discussion had  Cardiology stating this degree of cardiac injury in an end-stage, hospice diagnosis  Taking patient to cath lab emergently not recommended, daughter in full agreement  Daughter opting for comfort care at this time with no further aggressive management specifically no vasopressors, no further cardioversion attempt or dysrhythmic medications  I discussed case with hospice liaison on-call, patient was evaluated and deemed candidate for discharge on home hospice  Patient discharged back to [facility] with supportive home hospice  Disposition  Final diagnoses:   Ventricular tachycardia ([facility] )   Elevated troponin   MI (myocardial infarction) ([facility] )     Time reflects when diagnosis was documented in both MDM as applicable and the Disposition within this note     Time User Action Codes Description Comment    7/15/2022 12:37 PM Katharine Ibarra Add [I47 2] Ventricular tachycardia ([facility] )     7/15/2022 12:44 PM Newt Zara Modify [I47 2] Ventricular tachycardia ([facility] )     7/15/2022 12:44 PM Newt Zara Add [R77 8] Elevated troponin     7/15/2022 12:45 PM Jacinto Drake Add [I21 9] MI (myocardial infarction) [facility])       ED Disposition     ED Disposition   Discharge    Condition   Stable    Date/Time   Fri Jul 15, 2022  2:36 PM    Domo Louise discharge to home hospice                  Follow-up Information     Follow up With Specialties Details Why Contact Info    Porsceh Hernandez MD Internal Medicine, Geriatric Medicine Schedule an appointment as soon as possible for a visit   [address]  [phone]            Discharge Medication List as of 7/15/2022  2:39 PM      CONTINUE these medications which have NOT CHANGED    Details   acetaminophen (TYLENOL) 325 mg tablet Take 650 mg by mouth every 4 (four) hours as needed, Historical Med      acetaminophen (TYLENOL) 650 mg suppository Insert 650 mg into the rectum every 4 (four) hours as needed, Historical Med      amLODIPine-benazepril (LOTREL 5-20) 5-20 MG per capsule Take 1 capsule by mouth daily, Starting u 4/7/2022, Historical Med      atorvastatin (LIPITOR) 40 mg tablet Take 40 mg by mouth daily, Historical Med      bisacodyl (DULCOLAX) 10 mg suppository Insert 10 mg into the rectum daily as needed , Historical Med      calcium carbonate-vitamin D (OSCAL-D) 500 mg-200 units per tablet Take 1 tablet by mouth 2 (two) times a day, Historical Med      ferrous gluconate (FERGON) 324 mg tablet Take 324 mg by mouth every other day, Starting Wed 4/6/2022, Historical Med      magnesium hydroxide (MILK OF MAGNESIA) 400 mg/5 mL oral suspension Take 30 mL by mouth daily as needed, Historical Med      pantoprazole (PROTONIX) 40 mg tablet Take 40 mg by mouth daily, Historical Med      potassium chloride (K-DUR,KLOR-CON) 20 mEq tablet Take 40 mEq by mouth daily, Historical Med      senna-docusate sodium (SENOKOT S) 8 6-50 mg per tablet Take 1 tablet by mouth in the morning , Starting Wed 4/6/2022, Historical Med      sodium phosphate-biphosphate (FLEET) 7-19 g 118 mL enema Insert 1 enema into the rectum daily as needed , Historical Med           No discharge procedures on file  PDMP Review     None           ED Provider  Attending physically available and evaluated Vitalyearl Fenton  PEPPER managed the patient along with the ED Attending      Electronically Signed by         Claudia Asher MD  07/16/22 2440

## 2022-07-15 NOTE — CONSULTS
Consultation - General Cardiology Team 1  Pooja Hernandez 80 y o  female MRN: [id number]  Unit/Bed#: ED 10 Encounter: [id number]      Inpatient consult to Cardiology  Consult performed by: Maegan Durán MD  Consult ordered by: Maegan Durán MD        PCP: Christianne Terrazas MD   Outpatient Cardiologist:     History of Present Illness   Physician Requesting Consult: Rojas Watson DO  Reason for Consult / Principal Problem: vt and chest pain     HPI: Adelso De Oliveira is a 80y o  year old female with a history of hypertension, hyperlipidemia, advanced dementia history of coronary artery disease and a known knee, the 2 diabetes, stroke coma CKD presenting with 2 days of chest pain and found to be in monomorphic ventricular tachycardia unstable  Apparently the patient was complaining of chest discomfort and epigastric pain for the past couple days that was not relieved with sublingual nitro and Pepcid  She presented to the emergency room and 1 morbid ventricular tachycardia and received multiple shocks  Unfortunately the ventricular tachycardia was incessant and still present while she was lying in the emergency room  Patient was not able to give any medical information as she was lethargic due to sedation  She was in the monomorphic ventricular tachycardia and also hypertensive with pressures in the 70s over 40s  I performed a bedside echo ultrasound that showed severely depressed ejection fraction at 25-30% and hypokinesis/akinesis of her whole anterior wall  The patient's daughter was at bedside that expressed wishes of the patient of not being aggressive including no pressors, no Cardiac catheterization, and no chest compressions with the patient being DNR/DNI  She was bolused 150 of amiodarone and she was given some IV lidocaine  She briefly broke into sinus rhythm which showed ST elevations with Q-waves in anterior precordial leads consistent with anterior MI    Unfortunately, she went back into monomorphic ventricular tachycardia and hemodynamically unstable  The discussion at bedside with the patient's daughter was to transition patient comfort care measures  Review of Systems  Review of system was conducted and was negative except for as stated in the HPI  Historical Information   Past Medical History:   Diagnosis Date    Coronary artery disease     Diabetes mellitus [facility]     Stroke ([facility]     Uterine cancer ([facility] )      Past Surgical History:   Procedure Laterality Date    BREAST SURGERY Right     Lumpectomy    CATARACT EXTRACTION, BILATERAL      HYSTERECTOMY      OOPHORECTOMY       Social History     Substance and Sexual Activity   Alcohol Use Not Currently     Social History     Substance and Sexual Activity   Drug Use Not on file     Social History     Tobacco Use   Smoking Status Never Smoker   Smokeless Tobacco Never Used     Family History: non-contributory    Meds/Allergies   Hospital Medications:   Current Facility-Administered Medications   Medication Dose Route Frequency    amiodarone 150 mg in dextrose 5 % 100 mL IV bolus  150 mg Intravenous Once    sodium chloride (PF) 0 9 % injection 3 mL  3 mL Intravenous Q1H PRN    sodium chloride 0 9 % bolus 1,000 mL  1,000 mL Intravenous Once     Home Medications: (Not in a hospital admission)      No Known Allergies    Objective   Vitals: Blood pressure (!) 85/49, pulse (!) 128, temperature 97 9 °F (36 6 °C), temperature source Tympanic, resp  rate (!) 27, weight 74 9 kg (165 lb 3 2 oz), SpO2 93 %    Orthostatic Blood Pressures    Flowsheet Row Most Recent Value   Blood Pressure 85/49 filed at 07/15/2022 1147   Patient Position - Orthostatic VS Lying filed at 07/15/2022 1142            Invasive Devices  Report    Peripheral Intravenous Line  Duration           Peripheral IV 07/15/22 Left;Ventral (anterior) Wrist <1 day    Peripheral IV 07/15/22 Right Antecubital <1 day    Peripheral IV 07/15/22 Right;Ventral (anterior) Wrist <1 day                Physical Exam    GEN: Mag Rivera lethargic  HEENT:  Normocephalic, atraumatic, anicteric, moist mucous membranes  HEART: regular rhythm, tachycardic rate, normal S1 and S2, no murmurs, clicks, gallops or rubs   LUNGS: Clear to auscultation bilaterally; no wheezes, rales, or rhonchi; respiration nonlabored   ABDOMEN:  Normoactive bowel sounds, soft, no tenderness, no distention  EXTREMITIES: peripheral pulses palpable; no edema  NEURO: no gross focal findings; cranial nerves grossly intact   SKIN:  Dry, intact, warm to touch    Lab Results: I have personally reviewed pertinent lab results  Results from last 7 days   Lab Units 07/15/22  1103   HS TNI 0HR ng/L >22,973*         Results from last 7 days   Lab Units 07/15/22  1103   POTASSIUM mmol/L 5 3   CO2 mmol/L 24   CHLORIDE mmol/L 108   BUN mg/dL 44*   CREATININE mg/dL 1 30     Results from last 7 days   Lab Units 07/15/22  1103   HEMOGLOBIN g/dL 13 0   HEMATOCRIT % 40 1   PLATELETS Thousands/uL 261             Imaging: I have personally reviewed pertinent reports  Telemetry:       EKG:   Monomorphic VT consistent with apical site       Previous STRESS TEST:  No results found for this or any previous visit  No results found for this or any previous visit  No results found for this or any previous visit  Previous Cath/PCI:  No results found for this or any previous visit  No results found for this or any previous visit  No results found for this or any previous visit  ECHO:  No results found for this or any previous visit  No results found for this or any previous visit  ANGEL:  No results found for this or any previous visit  No results found for this or any previous visit  CMR:  No results found for this or any previous visit  No results found for this or any previous visit  No results found for this or any previous visit        HOLTER  No results found for this or any previous visit  No results found for this or any previous visit  VTE Prophylaxis: Sequential compression device Jerica Rotunda)        Assessment/Plan    Pooja Ayala is a 80y o  year old female with a history of hypertension, hyperlipidemia, advanced dementia history of coronary artery disease and a known knee, the 2 diabetes, stroke coma CKD presenting with 2 days of chest pain and found to be in monomorphic ventricular tachycardia unstable  Hemodynamically unstable Monomorphic Ventricular Tachycardia most likely in the setting of anterior large myocardial infarction:  The patient has a history of prior MI and coronary artery disease with multiple risk factors for subsequent coronary disease that presented with a couple days of chest pain and monomorphic VT  My personal review of the ECG reveals monomorphic ventricular tachycardia most likely from an apical site ischemia/scar  The patient broke into sinus rhythm and her EKG shows normal sinus rhythm with Q-waves and ST elevations in her anterior precordial leads  Her bedside ultrasound was revealing for severe reduced ejection fraction about 25-30% and severe hypokinesisa/akinesis of the whole anterior wall and apex   -the patient's daughter expressed pursuing not aggressive measures which I think is reasonable in her case considering her comorbid conditions and advanced dementia  She does not want her mother to pursue cardiac catheterization, pressor medication support, any more shocks, and she expresses that she is DNR/DNI  -there are limited treatment options at this point without pursuing aggressive measures and unfortunately I think is will cause the demise of the patient  This was relayed to her daughter and the decision was to transition the patient to comfort care measures, which I think is reasonable and appropriate       Case discussed and reviewed with Dr Nicholas Brasher who agrees with my assessment and plan      Thank you for involving us in the care of your patient  Nancy Nunez MD  Cardiology Fellow PGY-5    ==========================================================================================    Counseling / Coordination of Care  Total floor / unit time spent today 45 minutes minutes  Greater than 50% of total time was spent with the patient and / or family counseling and / or coordination of care  A description of the counseling / coordination of care:          Epic/ Allscripts/Care Everywhere records reviewed:     ** Please Note: Fluency DirectDictation voice to text software may have been used in the creation of this document   **

## 2022-07-15 NOTE — CASE COMMUNICATION
For Perlitagary Linden  80y o  year old female with a hx of hypertension, hyperlipidemia, advanced dementia history of coronary artery disease and a known knee, the 2 diabetes, stroke coma CKD presenting with 2 days of chest pain and found to be in monomorphic ventricular tachycardica  Echo ultrasound that showed severely depressed ejection fraction at 25-30% and hypokinesis/akinesis of her whole anterior wall  She was bolused  150 of amiodarone and she was given some IV lidocaine  She briefly broke into sinus rhythm which showed ST elevations with Q-waves in anterior precordial leads consistent with anterior MI   PPS 20    Approved for RLOC by Dr Lina Iraheta 7 15 22  Dx Coronary artery disease   i25 10

## 2022-07-15 NOTE — PROGRESS NOTES
[address]  [phone]  [facility]  Code  32      NAME: Emily Blanco  AGE: 80 y o  SEX: female [id number]    DATE OF ENCOUNTER: 7/15/2022    CODE STATUS: DNR    Assessment and Plan     Problem List Items Addressed This Visit        Other    Chest pain - Primary     Acute chest pain  Has been having some mild epigastric pain, non-radiating, no associated symptoms  Today pain much worse, now diaphoretic   BP 96/55 RR 36  No hx of Afib or SVT  Not on bblocker or calcium channel blocker  Will send to ED for CP eval   Family updated               No orders of the defined types were placed in this encounter  Chief Complaint     Chief Complaint   Patient presents with    Geriatric Evaluation     Chest pain, tachy       History of Present Illness   80year old female resident of [address] [place] [address] being seen today for chest pain and tachycardia  She is diaphoretic and having respiratory distress   No hx of afib or SVT       The following portions of the patient's history were reviewed and updated as appropriate: allergies, current medications, past family history, past medical history, past social history, past surgical history and problem list     Review of Systems   Review of Systems   Unable to perform ROS: Acuity of condition          Active Problem List     Patient Active Problem List   Diagnosis    Hypertension    Coronary artery disease involving native coronary artery of native heart without angina pectoris    Chronic back pain    Dementia [facility]    Type 2 diabetes mellitus without complication, without long-term current use of insulin ([address] )    GERD (gastroesophageal reflux disease)    History of TIA (transient ischemic attack)    Hyperlipidemia    Peripheral vascular disease, unspecified ([address] )    Hypokalemia    Multiple lacunar infarcts ([address] )    SDH (subdural hematoma) ([facility]    Spinal stenosis of lumbar region    CKD (chronic kidney disease) stage 3, GFR 30-59 ml/min ([facility])    History of stroke    Iron deficiency anemia    Debility    Constipation    Urinary frequency    Ambulatory dysfunction    Frailty syndrome in geriatric patient    DNR (do not resuscitate)    COVID    Chest pain         Objective     BP 96/55   Pulse (!) 155   Temp 97 8 °F (36 6 °C)   Resp (!) 36   Wt 74 9 kg (165 lb 3 2 oz)     Physical Exam  Vitals reviewed  Constitutional:       General: She is in acute distress  Appearance: She is diaphoretic  HENT:      Mouth/Throat:      Mouth: Mucous membranes are moist       Pharynx: Oropharynx is clear  Cardiovascular:      Rate and Rhythm: Tachycardia present  Rhythm irregular  Pulmonary:      Effort: Respiratory distress present  Neurological:      Mental Status: She is alert  Mental status is at baseline  She is disoriented  Pertinent Laboratory/Diagnostic Studies:    reviewed    Current Medications   Medications reviewed and updated in facility chart

## 2022-07-15 NOTE — PROGRESS NOTES
Pastoral Care Progress Note    7/15/2022  Patient: Arleth Machado : 1930  Admission Date & Time: 7/15/2022 1043  MRN: [id number] [facility]: [id number]           called to support family of pt  Provided hospitality, empathetic listening and emotional support  This  notified  that family requested [facility]  Kaur Mandel was able to confer LR to pt  Family grateful for support       07/15/22 1500   Clinical Encounter Type   Visited With Patient and family together   Routine Visit Introduction   Crisis Visit ED   Referral From Nurse   Referral To avel Barker

## 2022-07-15 NOTE — ED PROCEDURE NOTE
PROCEDURE  CriticalCare Time  Performed by: Vero Kaye DO  Authorized by:  Vero Kaye DO     Critical care provider statement:     Critical care time (minutes):  45    Critical care time was exclusive of:  Separately billable procedures and treating other patients and teaching time    Critical care was necessary to treat or prevent imminent or life-threatening deterioration of the following conditions:  Cardiac failure    Critical care was time spent personally by me on the following activities:  Blood draw for specimens, obtaining history from patient or surrogate, development of treatment plan with patient or surrogate, discussions with consultants, evaluation of patient's response to treatment, examination of patient, review of old charts, re-evaluation of patient's condition, ordering and review of laboratory studies and ordering and performing treatments and interventions    I assumed direction of critical care for this patient from another provider in my specialty: yamilet Kaye DO  07/15/22 1519

## 2022-07-15 NOTE — CASE MANAGEMENT
Case Management ED Discharge Planning Note    Patient name Adele Jules  Location ED 10/ED 10 MRN [id number]  : 1930 Date 7/15/2022        OBJECTIVE:  Predictive Model Details         76% Factor Value    Risk of Hospital Admission or ED Visit Model Number of ED Visits 1     Is in Relationship No     Has Chronic Kidney Disease Yes     Has Peripheral Vascular Disease Yes     Has Anemia Yes     Has Medicare Yes     Has CVD Yes     Has Diabetes Yes     Has PCP Yes       Chief Complaint: Chest pain   Patient Class: Emergency  Preferred Pharmacy:   UNKNOWN - FOLLOW UP PRIOR TO DISCHARGE TO E-PRESCRIBE  No address on file      Primary Care Provider: Dolores Farias MD    Primary Insurance: [facility]  Secondary Insurance:     ED Discharge Details:    Discharge planning discussed with[de-identified] daughters  Freedom of Choice: Yes     CM contacted family/caregiver?: Yes  Were Treatment Team discharge recommendations reviewed with patient/caregiver?: Yes  Did patient/caregiver verbalize understanding of patient care needs?: N/A- going to facility  Were patient/caregiver advised of the risks associated with not following Treatment Team discharge recommendations?: Yes    Contacts  Patient Contacts: Cami Johnson  Relationship to Patient[de-identified] Family  Contact Method: In Person  Reason/Outcome: Continuity of Care, Emergency Contact, Discharge Planning    Other Referral/Resources/Interventions Provided:  Interventions: Hospice, Transportation  Referral Comments: referral made to [facility], [facility]  Pt was accepted for home hospice with pt being opened tomorrow (22) per liaison Елена Nunez  CM requested BLS transport via Ecin, waiting for a  time      Treatment Team Recommendation: Hospice, Facility Return  Discharge Destination Plan[de-identified] Facility Return, Hospice     Transport at Discharge : BLS Ambulance  Dispatcher Contacted: Yes  Number/Name of Dispatcher: MARY      Additional Comments: CM consulted to assist with hospice referral   With family consent CM sent a referral to [facility]  MEÑO and Mark Clemente from [facility] met with family  After Mark Clemente presented pt to Hospice provider pt was deemed approprate for home hospice  MEÑO reached out to [facility] where CM spoke to Allie Awad ([phone]), pt can return today with hospice opening tomorrow  MEÑO completed Out of Hospital DNR with Attending and pt's daughter  MEÑO completed a BLS ambulance request via Ecin, waiting for a  time

## 2022-07-15 NOTE — ED NOTES
Patient currently level 4 comfort care  Monitors turned off in room  Daughter at bedside       Joanna Kelley RN  07/15/22 1950

## 2022-07-15 NOTE — CASE MANAGEMENT
Case Management ED Assessment    Patient name Cassy Reza  Location ED 10/ED 10 MRN [id number]  : 1930 Date 7/15/2022        OBJECTIVE:  Predictive Model Details         76% Factor Value    Risk of Hospital Admission or ED Visit Model Number of ED Visits 1     Is in Relationship No     Has Chronic Kidney Disease Yes     Has Peripheral Vascular Disease Yes     Has Anemia Yes     Has Medicare Yes     Has CVD Yes     Has Diabetes Yes     Has PCP Yes       Chief Complaint: Chest pain   Patient Class: Emergency  Preferred Pharmacy:   UNKNOWN - FOLLOW UP PRIOR TO DISCHARGE TO E-PRESCRIBE  No address on file      Primary Care Provider: Dionna Reich MD    Primary Insurance: [facility]  Secondary Insurance:     ED ASSESSMENT:  Jess Michael Proxies    There are no active Health Care Proxies on file  Readmission Root Cause  30 Day Readmission: No    Outpatient Care Information  Have you seen a doctor in the last year?: Yes    Prescribed Medications Prior to Admission/ED Visit  Has patient been prescribed medications (prior to this ED visit/admission)?: Yes  Any difficulty obtaining medications?: No  Has the patient been taking all prescribed medication(s)?: Yes  Does the patient have difficulty affording medication(s)?: No    Patient Information  Admitted from[de-identified] Facility  Mental Status: Confused  During Assessment patient was accompanied by: Daughter  Assessment information provided by[de-identified] Daughter  Primary Caregiver:  Other (Comment)  Caregiver's Name[de-identified] Chikis Victor Relationship to Patient[de-identified] Other (Specify) (staff)  Caregiver's Telephone Number[de-identified] [phone]  Support Systems: Family members, /  [place] of Residence: [address] [place] [address] [place] [facility]  [place] of Current Residence: Facility  Upon entering residence, is there a bedroom on the main floor (no further steps)?: Yes  Upon entering residence, is there a bathroom on the main floor (no further steps)?: Yes  In the last 12 months, was there a time when you were not able to pay the mortgage or rent on time?: No  In the last 12 months, how many places have you lived?: 1  In the last 12 months, was there a time when you did not have a steady place to sleep or slept in a shelter (including now)?: No  Homeless/housing insecurity resource given?: No  Living Arrangements: Other (Comment) (facility)    Patient Information Continued  Income Source: Pension/snf  Does patient have prescription coverage?: Yes  Within the past 12 months, you worried that your food would run out before you got the money to buy more : Never true  Within the past 12 months, the food you bought just didn't last and you didn't have money to get more : Never true  Food insecurity resource given?: No  Does patient receive dialysis treatments?: No  Does patient have a history of substance abuse?: No  Does patient have a history of Mental Health Diagnosis?: No    Food and Transportation  What is patient's usual means of transportation?: Family Transport  Ask patient:  How would you describe your eating habits?: Good

## 2022-07-15 NOTE — ASSESSMENT & PLAN NOTE
Acute chest pain  Has been having some mild epigastric pain, non-radiating, no associated symptoms  Today pain much worse, now diaphoretic   BP 96/55 RR 36  No hx of Afib or SVT   Not on bblocker or calcium channel blocker  Will send to ED for CP eval   Family updated

## 2022-07-23 NOTE — ED PROCEDURE NOTE
PROCEDURE  Cardioversion    Date/Time: 7/15/2022 11:08 AM  Performed by: Young Vang DO  Authorized by:  Young Vang DO     Verbal consent obtained?: Yes    Consent given by:  Patient  Patient states understanding of procedure being performed: Yes    Patient's understanding of procedure matches consent: Yes    Procedure consent matches procedure scheduled: Yes    Relevant documents present and verified: Yes    Patient identity confirmed:  Verbally with patient and arm band  Time out: Immediately prior to the procedure a time out was called    Patient sedated: Yes    Sedation:  Etomidate  Cardioversion basis:  Emergent  Pre-procedure rhythm:  Ventricular tachycardia  Position: Patient was placed in a supine position    Chest area exposed: Chest area was exposed    Electrodes:  Pads  Number of attempts:  3  Attempt 1:     Attempt 1 mode:  Synchronous    Attempt 1 waveform:  Biphasic    Attempt 1 shock (Joules):  200    Attempt 1 outcome:  No change in rhythm  Attempt 2:     Attempt 2 mode:  Synchronous    Attempt 2 waveform:  Biphasic    Attempt 2 shock (Joules):  300    Attempt 2 outcome:  No change in rhythm  Attempt 3:     Attempt 3 mode:  Synchronous    Attempt 3 waveform:  Biphasic    Attempt 3 shock (Joules):  360    Attempt 3 outcome:  No change in rhythm  Post-procedure rhythm:  Ventricular tachycardia         Young Vang DO  07/23/22 [address], DO  07/23/22 7463

## 2022-07-26 NOTE — TELEPHONE ENCOUNTER
[phone]/[email or web address] from [facility] calling to let the provider know that pt passed today 07/26/22 at 0310  Doreen Avila was paged via TC    Please allow the on-call provider 20-30 mins to respond  If you have not heard from them within that time, please feel free to call back

## 2022-07-28 ENCOUNTER — HOME CARE VISIT (OUTPATIENT)
Dept: HOME HOSPICE | Facility: HOSPICE | Age: 87
End: 2022-07-28
Payer: MEDICARE

## 2022-08-02 ENCOUNTER — HOME CARE VISIT (OUTPATIENT)
Dept: HOME HOSPICE | Facility: HOSPICE | Age: 87
End: 2022-08-02
Payer: MEDICARE

## 2022-08-02 NOTE — HOSPICE
Primary: Estela Dunn was a 80-year-old woman residing at [facility]  BC had limited information at time of death  TOD: phone call from Yolanda Rojas at [facility] to report pt passeda way  Pronounced by facility RN supervisor  Family was notified and they are on their way to facility  Yolanda Rojas will call back if support services needed  C/C: Condolence call attempted by Shira Bolton, unable to reach dtr Ariadna, message left along with number to reach SN with any questions/comments/concerns    DTR: Bozena Clark (Request address)    Chaplain Alan Rich to follow Lyssa Ziegler on a LR POC in 4-6 weeks